# Patient Record
Sex: FEMALE | Race: WHITE | NOT HISPANIC OR LATINO | URBAN - METROPOLITAN AREA
[De-identification: names, ages, dates, MRNs, and addresses within clinical notes are randomized per-mention and may not be internally consistent; named-entity substitution may affect disease eponyms.]

---

## 2017-06-02 ENCOUNTER — EMERGENCY (EMERGENCY)
Facility: HOSPITAL | Age: 47
LOS: 0 days | Discharge: HOME | End: 2017-06-02
Admitting: INTERNAL MEDICINE

## 2017-06-02 PROBLEM — Z00.00 ENCOUNTER FOR PREVENTIVE HEALTH EXAMINATION: Status: ACTIVE | Noted: 2017-06-02

## 2017-06-06 ENCOUNTER — APPOINTMENT (OUTPATIENT)
Dept: VASCULAR SURGERY | Facility: CLINIC | Age: 47
End: 2017-06-06

## 2017-06-06 ENCOUNTER — OUTPATIENT (OUTPATIENT)
Dept: OUTPATIENT SERVICES | Facility: HOSPITAL | Age: 47
LOS: 1 days | Discharge: HOME | End: 2017-06-06

## 2017-06-06 VITALS
BODY MASS INDEX: 36.65 KG/M2 | HEIGHT: 65 IN | DIASTOLIC BLOOD PRESSURE: 78 MMHG | WEIGHT: 220 LBS | SYSTOLIC BLOOD PRESSURE: 122 MMHG

## 2017-06-06 DIAGNOSIS — K22.70 BARRETT'S ESOPHAGUS W/OUT DYSPLASIA: ICD-10-CM

## 2017-06-06 RX ORDER — METAXALONE 800 MG/1
800 TABLET ORAL
Qty: 90 | Refills: 0 | Status: ACTIVE | COMMUNITY
Start: 2017-05-20

## 2017-06-28 DIAGNOSIS — I77.6 ARTERITIS, UNSPECIFIED: ICD-10-CM

## 2017-07-03 ENCOUNTER — OUTPATIENT (OUTPATIENT)
Dept: OUTPATIENT SERVICES | Facility: HOSPITAL | Age: 47
LOS: 1 days | Discharge: HOME | End: 2017-07-03

## 2017-07-03 DIAGNOSIS — Z12.31 ENCOUNTER FOR SCREENING MAMMOGRAM FOR MALIGNANT NEOPLASM OF BREAST: ICD-10-CM

## 2017-07-19 DIAGNOSIS — R20.9 UNSPECIFIED DISTURBANCES OF SKIN SENSATION: ICD-10-CM

## 2017-07-19 DIAGNOSIS — R51 HEADACHE: ICD-10-CM

## 2017-07-19 DIAGNOSIS — M54.2 CERVICALGIA: ICD-10-CM

## 2017-07-19 DIAGNOSIS — Z98.890 OTHER SPECIFIED POSTPROCEDURAL STATES: ICD-10-CM

## 2017-07-19 DIAGNOSIS — R42 DIZZINESS AND GIDDINESS: ICD-10-CM

## 2017-09-01 ENCOUNTER — TRANSCRIPTION ENCOUNTER (OUTPATIENT)
Age: 47
End: 2017-09-01

## 2017-09-21 ENCOUNTER — OUTPATIENT (OUTPATIENT)
Dept: OUTPATIENT SERVICES | Facility: HOSPITAL | Age: 47
LOS: 1 days | Discharge: HOME | End: 2017-09-21

## 2017-09-21 DIAGNOSIS — Z12.31 ENCOUNTER FOR SCREENING MAMMOGRAM FOR MALIGNANT NEOPLASM OF BREAST: ICD-10-CM

## 2017-09-25 DIAGNOSIS — N95.9 UNSPECIFIED MENOPAUSAL AND PERIMENOPAUSAL DISORDER: ICD-10-CM

## 2017-09-25 DIAGNOSIS — E55.9 VITAMIN D DEFICIENCY, UNSPECIFIED: ICD-10-CM

## 2017-09-25 DIAGNOSIS — Z13.820 ENCOUNTER FOR SCREENING FOR OSTEOPOROSIS: ICD-10-CM

## 2017-09-25 DIAGNOSIS — Z79.52 LONG TERM (CURRENT) USE OF SYSTEMIC STEROIDS: ICD-10-CM

## 2017-12-18 ENCOUNTER — APPOINTMENT (OUTPATIENT)
Dept: OTOLARYNGOLOGY | Facility: CLINIC | Age: 47
End: 2017-12-18
Payer: COMMERCIAL

## 2017-12-18 VITALS — HEIGHT: 65 IN | BODY MASS INDEX: 39.99 KG/M2 | WEIGHT: 240 LBS

## 2017-12-18 VITALS — HEIGHT: 65 IN | WEIGHT: 225 LBS | BODY MASS INDEX: 37.49 KG/M2

## 2017-12-18 PROCEDURE — 99204 OFFICE O/P NEW MOD 45 MIN: CPT | Mod: 25

## 2017-12-18 PROCEDURE — 31575 DIAGNOSTIC LARYNGOSCOPY: CPT

## 2018-02-18 ENCOUNTER — OUTPATIENT (OUTPATIENT)
Dept: OUTPATIENT SERVICES | Facility: HOSPITAL | Age: 48
LOS: 1 days | Discharge: HOME | End: 2018-02-18

## 2018-02-20 DIAGNOSIS — G47.33 OBSTRUCTIVE SLEEP APNEA (ADULT) (PEDIATRIC): ICD-10-CM

## 2018-03-23 ENCOUNTER — APPOINTMENT (OUTPATIENT)
Dept: OTOLARYNGOLOGY | Facility: CLINIC | Age: 48
End: 2018-03-23
Payer: COMMERCIAL

## 2018-03-23 VITALS
BODY MASS INDEX: 39.99 KG/M2 | SYSTOLIC BLOOD PRESSURE: 132 MMHG | DIASTOLIC BLOOD PRESSURE: 89 MMHG | HEIGHT: 65 IN | WEIGHT: 240 LBS

## 2018-03-23 DIAGNOSIS — H61.20 IMPACTED CERUMEN, UNSPECIFIED EAR: ICD-10-CM

## 2018-03-23 DIAGNOSIS — R09.81 NASAL CONGESTION: ICD-10-CM

## 2018-03-23 DIAGNOSIS — R06.83 SNORING: ICD-10-CM

## 2018-03-23 PROCEDURE — 69210 REMOVE IMPACTED EAR WAX UNI: CPT

## 2018-03-23 PROCEDURE — 99214 OFFICE O/P EST MOD 30 MIN: CPT | Mod: 25

## 2018-06-02 ENCOUNTER — OUTPATIENT (OUTPATIENT)
Dept: OUTPATIENT SERVICES | Facility: HOSPITAL | Age: 48
LOS: 1 days | Discharge: HOME | End: 2018-06-02

## 2018-06-04 DIAGNOSIS — G47.33 OBSTRUCTIVE SLEEP APNEA (ADULT) (PEDIATRIC): ICD-10-CM

## 2018-07-06 ENCOUNTER — OUTPATIENT (OUTPATIENT)
Dept: OUTPATIENT SERVICES | Facility: HOSPITAL | Age: 48
LOS: 1 days | Discharge: HOME | End: 2018-07-06

## 2018-07-06 DIAGNOSIS — Z12.31 ENCOUNTER FOR SCREENING MAMMOGRAM FOR MALIGNANT NEOPLASM OF BREAST: ICD-10-CM

## 2019-08-08 ENCOUNTER — OUTPATIENT (OUTPATIENT)
Dept: OUTPATIENT SERVICES | Facility: HOSPITAL | Age: 49
LOS: 1 days | Discharge: HOME | End: 2019-08-08
Payer: COMMERCIAL

## 2019-08-08 DIAGNOSIS — Z12.31 ENCOUNTER FOR SCREENING MAMMOGRAM FOR MALIGNANT NEOPLASM OF BREAST: ICD-10-CM

## 2019-08-08 PROCEDURE — 77067 SCR MAMMO BI INCL CAD: CPT | Mod: 26

## 2019-08-08 PROCEDURE — 77063 BREAST TOMOSYNTHESIS BI: CPT | Mod: 26

## 2020-08-12 ENCOUNTER — OUTPATIENT (OUTPATIENT)
Dept: OUTPATIENT SERVICES | Facility: HOSPITAL | Age: 50
LOS: 1 days | Discharge: HOME | End: 2020-08-12
Payer: COMMERCIAL

## 2020-08-12 DIAGNOSIS — Z12.31 ENCOUNTER FOR SCREENING MAMMOGRAM FOR MALIGNANT NEOPLASM OF BREAST: ICD-10-CM

## 2020-08-12 PROCEDURE — 77067 SCR MAMMO BI INCL CAD: CPT | Mod: 26

## 2020-08-12 PROCEDURE — 77063 BREAST TOMOSYNTHESIS BI: CPT | Mod: 26

## 2021-02-16 ENCOUNTER — NON-APPOINTMENT (OUTPATIENT)
Age: 51
End: 2021-02-16

## 2021-08-17 ENCOUNTER — OUTPATIENT (OUTPATIENT)
Dept: OUTPATIENT SERVICES | Facility: HOSPITAL | Age: 51
LOS: 1 days | Discharge: HOME | End: 2021-08-17

## 2021-08-17 ENCOUNTER — APPOINTMENT (OUTPATIENT)
Dept: OBGYN | Facility: CLINIC | Age: 51
End: 2021-08-17
Payer: COMMERCIAL

## 2021-08-17 VITALS — SYSTOLIC BLOOD PRESSURE: 122 MMHG | BODY MASS INDEX: 42.6 KG/M2 | WEIGHT: 256 LBS | DIASTOLIC BLOOD PRESSURE: 80 MMHG

## 2021-08-17 DIAGNOSIS — Z86.79 PERSONAL HISTORY OF OTHER DISEASES OF THE CIRCULATORY SYSTEM: ICD-10-CM

## 2021-08-17 DIAGNOSIS — M06.9 RHEUMATOID ARTHRITIS, UNSPECIFIED: ICD-10-CM

## 2021-08-17 DIAGNOSIS — Z80.3 FAMILY HISTORY OF MALIGNANT NEOPLASM OF BREAST: ICD-10-CM

## 2021-08-17 DIAGNOSIS — Z01.419 ENCOUNTER FOR GYNECOLOGICAL EXAMINATION (GENERAL) (ROUTINE) W/OUT ABNORMAL FINDINGS: ICD-10-CM

## 2021-08-17 PROCEDURE — 99386 PREV VISIT NEW AGE 40-64: CPT

## 2021-08-17 RX ORDER — CALCIUM CARBONATE/VITAMIN D3 600 MG-10
600-400 TABLET ORAL
Qty: 60 | Refills: 6 | Status: ACTIVE | COMMUNITY
Start: 2021-08-17 | End: 1900-01-01

## 2021-08-18 ENCOUNTER — APPOINTMENT (OUTPATIENT)
Dept: CARDIOLOGY | Facility: CLINIC | Age: 51
End: 2021-08-18
Payer: COMMERCIAL

## 2021-08-18 ENCOUNTER — RESULT CHARGE (OUTPATIENT)
Age: 51
End: 2021-08-18

## 2021-08-18 VITALS
DIASTOLIC BLOOD PRESSURE: 80 MMHG | WEIGHT: 254 LBS | SYSTOLIC BLOOD PRESSURE: 122 MMHG | BODY MASS INDEX: 42.32 KG/M2 | HEIGHT: 65 IN | TEMPERATURE: 98.5 F

## 2021-08-18 DIAGNOSIS — R06.02 SHORTNESS OF BREATH: ICD-10-CM

## 2021-08-18 DIAGNOSIS — Z78.9 OTHER SPECIFIED HEALTH STATUS: ICD-10-CM

## 2021-08-18 DIAGNOSIS — Z87.891 PERSONAL HISTORY OF NICOTINE DEPENDENCE: ICD-10-CM

## 2021-08-18 DIAGNOSIS — E66.9 OBESITY, UNSPECIFIED: ICD-10-CM

## 2021-08-18 DIAGNOSIS — E78.00 PURE HYPERCHOLESTEROLEMIA, UNSPECIFIED: ICD-10-CM

## 2021-08-18 PROCEDURE — 93000 ELECTROCARDIOGRAM COMPLETE: CPT

## 2021-08-18 PROCEDURE — 99204 OFFICE O/P NEW MOD 45 MIN: CPT

## 2021-08-18 RX ORDER — ACYCLOVIR 50 MG/G
5 OINTMENT TOPICAL
Qty: 15 | Refills: 0 | Status: DISCONTINUED | COMMUNITY
Start: 2017-05-20 | End: 2021-08-18

## 2021-08-18 RX ORDER — FLUTICASONE PROPIONATE 50 UG/1
50 SPRAY, METERED NASAL
Qty: 1 | Refills: 2 | Status: DISCONTINUED | COMMUNITY
Start: 2017-12-18 | End: 2021-08-18

## 2021-08-18 RX ORDER — BUTALBITAL, ACETAMINOPHEN AND CAFFEINE 325; 50; 40 MG/1; MG/1; MG/1
50-325-40 TABLET ORAL
Qty: 100 | Refills: 0 | Status: DISCONTINUED | COMMUNITY
Start: 2017-05-20 | End: 2021-08-18

## 2021-08-18 RX ORDER — NEOMYCIN SULFATE, POLYMYXIN B SULFATE, HYDROCORTISONE 3.5; 10000; 1 MG/ML; [USP'U]/ML; MG/ML
1 SOLUTION/ DROPS AURICULAR (OTIC)
Qty: 10 | Refills: 0 | Status: DISCONTINUED | COMMUNITY
Start: 2016-12-13 | End: 2021-08-18

## 2021-08-18 RX ORDER — SUCRALFATE 1 G/10ML
1 SUSPENSION ORAL
Qty: 600 | Refills: 0 | Status: ACTIVE | COMMUNITY
Start: 2021-05-10

## 2021-08-18 RX ORDER — BETAMETHASONE DIPROPIONATE 0.5 MG/G
0.05 CREAM, AUGMENTED TOPICAL
Qty: 50 | Refills: 0 | Status: DISCONTINUED | COMMUNITY
Start: 2016-06-30 | End: 2021-08-18

## 2021-08-18 RX ORDER — MELOXICAM 15 MG/1
15 TABLET ORAL
Qty: 30 | Refills: 0 | Status: ACTIVE | COMMUNITY
Start: 2021-05-08

## 2021-08-18 RX ORDER — PANTOPRAZOLE 40 MG/1
40 TABLET, DELAYED RELEASE ORAL DAILY
Refills: 0 | Status: ACTIVE | COMMUNITY

## 2021-08-18 RX ORDER — NORGESTREL AND ETHINYL ESTRADIOL 0.3-0.03MG
0.3-3 KIT ORAL
Qty: 28 | Refills: 0 | Status: DISCONTINUED | COMMUNITY
Start: 2016-11-30 | End: 2021-08-18

## 2021-08-18 RX ORDER — CIPROFLOXACIN AND DEXAMETHASONE 3; 1 MG/ML; MG/ML
0.3-0.1 SUSPENSION/ DROPS AURICULAR (OTIC)
Qty: 7 | Refills: 0 | Status: DISCONTINUED | COMMUNITY
Start: 2017-05-20 | End: 2021-08-18

## 2021-08-18 RX ORDER — FAMOTIDINE 40 MG/1
40 TABLET, FILM COATED ORAL
Refills: 0 | Status: DISCONTINUED | COMMUNITY
End: 2021-08-18

## 2021-08-18 RX ORDER — PANTOPRAZOLE 40 MG/1
40 TABLET, DELAYED RELEASE ORAL
Qty: 90 | Refills: 0 | Status: DISCONTINUED | COMMUNITY
Start: 2017-02-20 | End: 2021-08-18

## 2021-08-18 RX ORDER — BUTALBITAL, ACETAMINOPHEN, AND CAFFEINE 50; 300; 40 MG/1; MG/1; MG/1
50-300-40 CAPSULE ORAL
Refills: 0 | Status: DISCONTINUED | COMMUNITY
End: 2021-08-18

## 2021-08-18 RX ORDER — LINACLOTIDE 145 UG/1
145 CAPSULE, GELATIN COATED ORAL
Qty: 30 | Refills: 0 | Status: ACTIVE | COMMUNITY
Start: 2021-03-26

## 2021-08-18 RX ORDER — METHOTREXATE 2.5 MG/1
2.5 TABLET ORAL
Qty: 24 | Refills: 0 | Status: ACTIVE | COMMUNITY
Start: 2021-07-06

## 2021-08-18 RX ORDER — NALTREXONE HYDROCHLORIDE AND BUPROPION HYDROCHLORIDE 8; 90 MG/1; MG/1
8-90 TABLET, EXTENDED RELEASE ORAL
Qty: 120 | Refills: 0 | Status: DISCONTINUED | COMMUNITY
Start: 2017-04-26 | End: 2021-08-18

## 2021-08-18 RX ORDER — AMOXICILLIN AND CLAVULANATE POTASSIUM 875; 125 MG/1; MG/1
875-125 TABLET, COATED ORAL
Qty: 20 | Refills: 0 | Status: DISCONTINUED | COMMUNITY
Start: 2016-12-13 | End: 2021-08-18

## 2021-08-18 NOTE — REVIEW OF SYSTEMS
[Fever] : no fever [Headache] : no headache [Chills] : no chills [Feeling Fatigued] : feeling fatigued [Lower Ext Edema] : no extremity edema [Leg Claudication] : no intermittent leg claudication [Orthopnea] : no orthopnea [Syncope] : no syncope [Rash] : no rash [Anxiety] : no anxiety [Negative] : Neurological

## 2021-08-18 NOTE — ASSESSMENT
[FreeTextEntry1] : 51-yo female with obesity, MYNOR.\par Severe hyperlipidemia.\par HOOKS.\par \par Plan:\par Continue current diet and weight loss, add daily walking, regular exercise.\par Patient is trying to arrange CPAP machine replacement so she can start using it again.\par Fasting blood work ordered.\par Exercise stress echocardiogram.\par \par Sung Damon MD\par

## 2021-08-24 ENCOUNTER — RESULT REVIEW (OUTPATIENT)
Age: 51
End: 2021-08-24

## 2021-08-24 ENCOUNTER — OUTPATIENT (OUTPATIENT)
Dept: OUTPATIENT SERVICES | Facility: HOSPITAL | Age: 51
LOS: 1 days | Discharge: HOME | End: 2021-08-24
Payer: COMMERCIAL

## 2021-08-24 DIAGNOSIS — Z12.31 ENCOUNTER FOR SCREENING MAMMOGRAM FOR MALIGNANT NEOPLASM OF BREAST: ICD-10-CM

## 2021-08-24 PROCEDURE — 77067 SCR MAMMO BI INCL CAD: CPT | Mod: 26

## 2021-08-24 PROCEDURE — 77063 BREAST TOMOSYNTHESIS BI: CPT | Mod: 26

## 2021-08-27 LAB — HPV HIGH+LOW RISK DNA PNL CVX: NOT DETECTED

## 2021-08-30 ENCOUNTER — APPOINTMENT (OUTPATIENT)
Dept: CARDIOLOGY | Facility: CLINIC | Age: 51
End: 2021-08-30

## 2021-09-03 LAB — CYTOLOGY CVX/VAG DOC THIN PREP: NORMAL

## 2022-04-11 ENCOUNTER — EMERGENCY (EMERGENCY)
Facility: HOSPITAL | Age: 52
LOS: 0 days | Discharge: HOME | End: 2022-04-11
Attending: EMERGENCY MEDICINE | Admitting: EMERGENCY MEDICINE
Payer: COMMERCIAL

## 2022-04-11 VITALS
HEART RATE: 65 BPM | TEMPERATURE: 97 F | HEIGHT: 66 IN | WEIGHT: 255.96 LBS | RESPIRATION RATE: 18 BRPM | SYSTOLIC BLOOD PRESSURE: 130 MMHG | OXYGEN SATURATION: 99 % | DIASTOLIC BLOOD PRESSURE: 60 MMHG

## 2022-04-11 DIAGNOSIS — M54.50 LOW BACK PAIN, UNSPECIFIED: ICD-10-CM

## 2022-04-11 DIAGNOSIS — M06.9 RHEUMATOID ARTHRITIS, UNSPECIFIED: ICD-10-CM

## 2022-04-11 DIAGNOSIS — E78.5 HYPERLIPIDEMIA, UNSPECIFIED: ICD-10-CM

## 2022-04-11 PROCEDURE — 99284 EMERGENCY DEPT VISIT MOD MDM: CPT

## 2022-04-11 RX ORDER — TIZANIDINE 4 MG/1
2 TABLET ORAL
Qty: 56 | Refills: 0
Start: 2022-04-11 | End: 2022-04-17

## 2022-04-11 RX ORDER — KETOROLAC TROMETHAMINE 30 MG/ML
30 SYRINGE (ML) INJECTION ONCE
Refills: 0 | Status: DISCONTINUED | OUTPATIENT
Start: 2022-04-11 | End: 2022-04-11

## 2022-04-11 RX ORDER — METHOCARBAMOL 500 MG/1
1000 TABLET, FILM COATED ORAL ONCE
Refills: 0 | Status: COMPLETED | OUTPATIENT
Start: 2022-04-11 | End: 2022-04-11

## 2022-04-11 RX ADMIN — METHOCARBAMOL 1000 MILLIGRAM(S): 500 TABLET, FILM COATED ORAL at 07:42

## 2022-04-11 RX ADMIN — Medication 30 MILLIGRAM(S): at 07:43

## 2022-04-11 NOTE — ED PROVIDER NOTE - CLINICAL SUMMARY MEDICAL DECISION MAKING FREE TEXT BOX
51-year-old female past medical history as documented with complaints of 2 days of left lower back pain.  Neurologic exam normal.  Patient ambulating well.  Patient improved in the ED with medication.  Patient discharged to home with instructions to take NSAIDs and prescribed tizanidine for symptoms.  Patient given rehab clinic follow-up and strict return instructions.

## 2022-04-11 NOTE — ED PROVIDER NOTE - PHYSICAL EXAMINATION
GENERAL: Well-nourished, Well-developed. NAD.  HEAD: No visible or palpable bumps or hematomas. No ecchymosis behind ears B/L.  Eyes: PERRLA, EOMI. No asymmetry. No nystagmus. No conjunctival injection. Non-icteric sclera.  ENMT: MMM.   Neck: Supple. FROM  CVS: RRR. Normal S1,S2. No murmurs appreciated on auscultation   RESP: No use of accessory muscles. Chest rise symmetrical with good expansion. Lungs clear to auscultation B/L. No wheezing, rales, or rhonchi auscultated.  GI: Normal auscultation of bowel sounds in all 4 quadrants. Soft, Nontender, Nondistended. No guarding or rebound tenderness. No CVAT B/L.  MSK: Extremities w/o deformity or ttp. No visible signs of trauma such as ecchymosis, erythema, or swelling. FROM of upper and lower extremities B/L. No midline spinal TTP. FROM of back with flexion and extension. (+)L straight leg raise. (+)L Lumbar paraspinal muscle ttp  Skin: Warm, Dry. No rashes or lesions. Good cap refill < 2 sec B/L.  EXT: Radial and pedal pulses present B/L. No calf tenderness or swelling B/L. No palpable cords. No pedal edema B/L.  Neuro: AA&O x 3. Sensation grossly intact. Strength 5/5 B/L. Gait within normal limits.

## 2022-04-11 NOTE — ED PROVIDER NOTE - ATTENDING CONTRIBUTION TO CARE
I personally evaluated the patient. I reviewed the Resident’s or Physician Assistant’s note (as assigned above), and agree with the findings and plan except as documented in my no51-year-old female past medical history significant for dyslipidemia, SLE, RA (on methotrexate), complaining of 2 days of left lower back pain.  Back pain is dull and radiates to the left lower extremities to the level of the calf.  Pain worsens with walking and movement and bending.  No recent trauma.  No relief with a heating pad last night and meloxicam this AM.  Normal urination.  No fever, chills.  No bowel or bladder dysfunction or incontinence.  Patient ambulating with antalgic gait.  No IVDA.  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  No history of back pain.  No extremity paresthesias or motor weakness.  Vitals noted.  CONSTITUTIONAL: Well-appearing; well-nourished; in mild apparent distress due to pain.   HEAD: Normocephalic; atraumatic.   EYES: PERRL; EOM intact. Conjunctiva normal B/L.   ENT: Normal pharynx with no tonsillar hypertrophy. MMM.  NECK: Supple; non-tender; no cervical lymphadenopathy.   CHEST: Normal chest excursion with respiration.   CARDIOVASCULAR: Normal S1, S2; no murmurs, rubs, or gallops.   RESPIRATORY: Normal chest excursion with respiration; breath sounds clear and equal bilaterally; no wheezes, rhonchi, or rales.  GI/: Normal bowel sounds; non-distended; non-tender.  BACK: No evidence of trauma or deformity. + tenderness to l lower lumbar paraspinal area. No CVA tenderness. No spine tenderness.   EXT: Normal ROM in all four extremities; non-tender to palpation; distal pulses are normal. No leg edema B/L.   SKIN: Normal for age and race; warm; dry; good turgor.  NEURO: A & O x 4; CN 2-12 intact. Grossly unremarkable. + Antalgic gait. I personally evaluated the patient. I reviewed the Resident’s or Physician Assistant’s note (as assigned above), and agree with the findings and plan except as documented in my note  51-year-old female past medical history significant for dyslipidemia, SLE, RA (on methotrexate), complaining of 2 days of left lower back pain.  Back pain is dull and radiates to the left lower extremities to the level of the calf.  Pain worsens with walking and movement and bending.  No recent trauma.  No relief with a heating pad last night and meloxicam this AM.  Normal urination.  No fever, chills.  No bowel or bladder dysfunction or incontinence.  Patient ambulating with antalgic gait.  No IVDA.  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  No history of back pain.  No extremity paresthesias or motor weakness.  Vitals noted.  CONSTITUTIONAL: Well-appearing; well-nourished; in mild apparent distress due to pain.   HEAD: Normocephalic; atraumatic.   EYES: PERRL; EOM intact. Conjunctiva normal B/L.   ENT: Normal pharynx with no tonsillar hypertrophy. MMM.  NECK: Supple; non-tender; no cervical lymphadenopathy.   CHEST: Normal chest excursion with respiration.   CARDIOVASCULAR: Normal S1, S2; no murmurs, rubs, or gallops.   RESPIRATORY: Normal chest excursion with respiration; breath sounds clear and equal bilaterally; no wheezes, rhonchi, or rales.  GI/: Normal bowel sounds; non-distended; non-tender.  BACK: No evidence of trauma or deformity. + tenderness to l lower lumbar paraspinal area. No CVA tenderness. No spine tenderness.   EXT: Normal ROM in all four extremities; non-tender to palpation; distal pulses are normal. No leg edema B/L.   SKIN: Normal for age and race; warm; dry; good turgor.  NEURO: A & O x 4; CN 2-12 intact. Grossly unremarkable. + Antalgic gait.

## 2022-04-11 NOTE — ED PROVIDER NOTE - OBJECTIVE STATEMENT
52 yo F pmhx TAWANNA, ALLY presenting to the ED for evaluation of L lower back pain radiating to LLE since last night 52 yo F pmhx TAWANNA, ALLY presenting to the ED for evaluation of atraumatic L lower back pain radiating to LLE since last night. Pt reports pain is constant, achy, and shooting down LLE. Pt applied heating pad last night and took 1 meloxicam this morning with minimal relief. Pain is worse with movement and ambulation. Denies any trauma or inciting event. Denies fever, chills, numbness/tingling, weakness, incontinence, saddle anesthesia, dysuria, hematuria.

## 2022-04-11 NOTE — ED PROVIDER NOTE - NS ED ROS FT
Constitutional: No fever, chills.  Eyes:  No visual changes  ENMT:  No neck pain  Cardiac:  No chest pain  Respiratory:  No cough, SOB  GI:  No nausea, vomiting, diarrhea, abdominal pain.  :  No dysuria, hematuria  MS:  (+)back pain.  Neuro:  No numbness/tingling or weakness  Skin:  No skin rash  Endocrine: No history of thyroid disease or diabetes.  Except as documented in the HPI,  all other systems are negative.

## 2022-04-11 NOTE — ED PROVIDER NOTE - NSFOLLOWUPCLINICS_GEN_ALL_ED_FT
[facility] ([facility])  Rehabilitation  [facility] [address], [address] [place] [address]  Phone: [phone]  Fax:   Follow Up Time: 1-3 Days

## 2022-04-11 NOTE — ED PROVIDER NOTE - NSFOLLOWUPINSTRUCTIONS_ED_ALL_ED_FT
Back Pain    WHAT YOU NEED TO KNOW:    Back pain is common. It can be caused by many conditions, such as arthritis or the breakdown of spinal discs. Your risk for back pain is increased by injuries, lack of activity, or repeated bending and twisting. You may feel sore or stiff on one or both sides of your back. The pain may spread to your buttocks or thighs.    DISCHARGE INSTRUCTIONS:    Return to the emergency department if:     You have pain, numbness, or weakness in one or both legs.      Your pain becomes so severe that you cannot walk.      You cannot control your urine or bowel movements.      You have severe back pain with chest pain.      You have severe back pain, nausea, and vomiting.      You have severe back pain that spreads to your side or genital area.    Contact your healthcare provider if:     You have back pain that does not get better with rest and pain medicine.      You have a fever.      You have pain that worsens when you are on your back or when you rest.      You have pain that worsens when you cough or sneeze.      You lose weight without trying.      You have questions or concerns about your condition or care.    Medicines:     NSAIDs help decrease swelling and pain. This medicine is available with or without a doctor's order. NSAIDs can cause stomach bleeding or kidney problems in certain people. If you take blood thinner medicine, always ask your healthcare provider if NSAIDs are safe for you. Always read the medicine label and follow directions.      Acetaminophen decreases pain and fever. It is available without a doctor's order. Ask how much to take and how often to take it. Follow directions. Read the labels of all other medicines you are using to see if they also contain acetaminophen, or ask your doctor or pharmacist. Acetaminophen can cause liver damage if not taken correctly. Do not use more than 4 grams (4,000 milligrams) total of acetaminophen in one day.       Muscle relaxers help decrease muscle spasms and back pain.      Prescription pain medicine may be given. Ask your healthcare provider how to take this medicine safely. Some prescription pain medicines contain acetaminophen. Do not take other medicines that contain acetaminophen without talking to your healthcare provider. Too much acetaminophen may cause liver damage. Prescription pain medicine may cause constipation. Ask your healthcare provider how to prevent or treat constipation.       Take your medicine as directed. Contact your healthcare provider if you think your medicine is not helping or if you have side effects. Tell him or her if you are allergic to any medicine. Keep a list of the medicines, vitamins, and herbs you take. Include the amounts, and when and why you take them. Bring the list or the pill bottles to follow-up visits. Carry your medicine list with you in case of an emergency.    How to manage your back pain:     Apply ice on your back for 15 to 20 minutes every hour or as directed. Use an ice pack, or put crushed ice in a plastic bag. Cover it with a towel before you apply it to your skin. Ice helps prevent tissue damage and decreases pain.      Apply heat on your back for 20 to 30 minutes every 2 hours for as many days as directed. Heat helps decrease pain and muscle spasms.      Stay active as much as you can without causing more pain. Bed rest could make your back pain worse. Avoid heavy lifting until your pain is gone.      Go to physical therapy as directed. A physical therapist can teach you exercises to help improve movement and strength, and to decrease pain.    Follow up with your healthcare provider in 2 weeks, or as directed: Write down your questions so you remember to ask them during your visits.       © Copyright [employer] 2019 All illustrations and images included in CareNotes are the copyrighted property of [employer] or [employer].

## 2022-04-11 NOTE — ED PROVIDER NOTE - NS ED ATTENDING STATEMENT MOD
This was a shared visit with the ROMARIO. I reviewed and verified the documentation and independently performed the documented:

## 2022-04-11 NOTE — ED PROVIDER NOTE - NSFOLLOWUPCLINICSTOKEN_GEN_ALL_ED_FT
IF YOU HAVE ANY QUESTIONS REGARDING YOUR HOME MEDICATIONS PLEASE CONTACT YOUR PRIMARY CARE PHYSICIAN   [id number]:1-3 Days|| ||00\01||False;

## 2022-07-26 ENCOUNTER — APPOINTMENT (OUTPATIENT)
Dept: CARDIOLOGY | Facility: CLINIC | Age: 52
End: 2022-07-26

## 2022-08-23 ENCOUNTER — APPOINTMENT (OUTPATIENT)
Dept: OBGYN | Facility: CLINIC | Age: 52
End: 2022-08-23

## 2022-08-25 ENCOUNTER — OUTPATIENT (OUTPATIENT)
Dept: OUTPATIENT SERVICES | Facility: HOSPITAL | Age: 52
LOS: 1 days | Discharge: HOME | End: 2022-08-25

## 2022-08-25 DIAGNOSIS — Z12.31 ENCOUNTER FOR SCREENING MAMMOGRAM FOR MALIGNANT NEOPLASM OF BREAST: ICD-10-CM

## 2022-08-25 PROCEDURE — 77063 BREAST TOMOSYNTHESIS BI: CPT | Mod: 26

## 2022-08-25 PROCEDURE — 77067 SCR MAMMO BI INCL CAD: CPT | Mod: 26

## 2023-08-28 ENCOUNTER — OUTPATIENT (OUTPATIENT)
Dept: OUTPATIENT SERVICES | Facility: HOSPITAL | Age: 53
LOS: 1 days | End: 2023-08-28
Payer: COMMERCIAL

## 2023-08-28 DIAGNOSIS — Z12.31 ENCOUNTER FOR SCREENING MAMMOGRAM FOR MALIGNANT NEOPLASM OF BREAST: ICD-10-CM

## 2023-08-28 PROCEDURE — 77063 BREAST TOMOSYNTHESIS BI: CPT | Mod: 26

## 2023-08-28 PROCEDURE — 77063 BREAST TOMOSYNTHESIS BI: CPT

## 2023-08-28 PROCEDURE — 77067 SCR MAMMO BI INCL CAD: CPT

## 2023-08-28 PROCEDURE — 77067 SCR MAMMO BI INCL CAD: CPT | Mod: 26

## 2023-08-29 DIAGNOSIS — Z12.31 ENCOUNTER FOR SCREENING MAMMOGRAM FOR MALIGNANT NEOPLASM OF BREAST: ICD-10-CM

## 2023-10-19 NOTE — HISTORY OF PRESENT ILLNESS
Patient: Helen Donato Date: 10/19/2023   : 1967 Attending: Danial Mcguire MD   56 year old female Room: [facility]     Chief Complaint: Shortness of breath and fatigue     Preoperative   Surgical Procedure: AVR, TVR     Subjective: Denies SOB and CP     Vital 24 Hour Range Last Value   Temperature Temp  Min: 98.3 °F (36.8 °C)  Max: 98.5 °F (36.9 °C) 98.5 °F (36.9 °C) (10/19/23 1527)   Pulse Pulse  Min: 73  Max: 80 80 (10/19/23 09)   Respiratory Resp  Min: 18  Max: 18 18 (10/19/23 1405)   Non-Invasive  Blood Pressure BP  Min: 126/65  Max: 130/64 130/64 (10/19/23 0927)   Pulse Oximetry SpO2  Min: 95 %  Max: 96 % 96 % (10/19/23 0927)     Oxygen: RA    Weight over the past 48 Hours:  Patient Vitals for the past 48 hrs:   Weight   10/17/23 1628 56 kg (123 lb 7.3 oz)   10/18/23 0500 55.2 kg (121 lb 9.6 oz)   10/19/23 0600 54.8 kg (120 lb 13 oz)      Admit Weight:   Weight: 56 kg (123 lb 7.3 oz) (10/17/23 1628)  BMI (body mass index):   BMI (Calculated): 19.34 (10/17/23 162)    Intake/Output:    Last Stool Occurrence:  (LBM 10/17 per pt) (10/18/23 0300)    Rhythm: Sinus rhythm    Laboratory Results:    Recent Labs   Lab 10/19/23  0346 10/18/23  034   SODIUM 141 141   POTASSIUM 4.4 4.5   CHLORIDE 110 111*   CO2 27 26   BUN 23* 25*   CREATININE 0.94 1.00*   GLUCOSE 97 87   MG 2.0 2.0   WBC 8.8 7.6   HGB 9.1* 8.6*   HCT 29.7* 27.7*    354       Recent Labs   Lab 10/19/23  034   AST 22   GPT 23   ALKPT 77   BILIRUBIN 0.3       Hemoglobin A1C (%)   Date Value   2021 5.7 (H)       Carotid US:  No results found for this or any previous visit.       Medications/Infusions:  Scheduled:   Current Facility-Administered Medications   Medication Dose Route Frequency Provider Last Rate Last Admin   • sodium chloride 0.9 % injection 2 mL  2 mL Intracatheter 2 times per day Danial Mcguire MD   2 mL at 10/19/23 0931   • heparin (porcine) injection 5,000 Units  5,000 Units Subcutaneous 3 times per day Danial Mcguire,  MD       • docusate sodium-sennosides (SENOKOT S) 50-8.6 MG 2 tablet  2 tablet Oral Nightly Danial Mcguire MD       • lactobacillus acidophilus (BACID) tablet 1 tablet  1 tablet Oral Daily Danial Mcguire MD   1 tablet at 10/19/23 0930   • pantoprazole (PROTONIX) EC tablet 40 mg  40 mg Oral Nightly Danial Mcguire MD   40 mg at 10/18/23 2145   • diclofenac (VOLTAREN) 1 % gel 2 g  2 g Topical 4x Daily Danial Mcguire MD       • escitalopram (LEXAPRO) tablet 20 mg  20 mg Oral Daily Danial Mcguire MD   20 mg at 10/19/23 0930   • ferrous sulfate (65 mg Fe per 325 mg) tablet 325 mg  325 mg Oral Daily with breakfast Danial Mcguire MD   325 mg at 10/19/23 0930   • folic acid (FOLATE) tablet 1 mg  1 mg Oral Daily Danial Mcguire MD   1 mg at 10/19/23 0930   • gabapentin (NEURONTIN) capsule 600 mg  600 mg Oral 3 times per day Danial Mcguire MD   600 mg at 10/19/23 1405   • QUEtiapine (SEROquel) tablet 75 mg  75 mg Oral Nightly Danial Mcguire MD   75 mg at 10/18/23 2145   • thiamine (VITAMIN B1) tablet 100 mg  100 mg Oral Daily Danial Mcguire MD   100 mg at 10/19/23 0930       Continuous Infusions:   Current Facility-Administered Medications   Medication Dose Route Frequency Provider Last Rate Last Admin   • sodium chloride 0.9% infusion   Intravenous Continuous Tika Núñez APNP         STS Risk of Mortality: N/A    Pre-op anemia consult: Yes    Fraility Index  6 - moderately frail    New York Heart Association Classification  Class II: Comfortable at rest, symptomatic with ordinary physical activity    CHADSVASC Stroke Risk Points     Patient's CHADSVASC Score Total = 1    Patient's CHADSVASC Score Summary    Element Patient Score   Congestive Heart Failure 0   High blood pressure 0   Age 0    Diabetes 0   Stroke/TIA/Clot 0   Vascular disease 0   Sex 1     Assessment/Plan:  Endocarditis with septic emboli, AV and TV vegetations and AI:    -Plan for AVR/TVR with Dr. Bailey on 10/23   -Pre-op orders initiated   -Consult Neurology for  [FreeTextEntry1] : 51-yo female with h/o RA, giant cell arteritis (on high-dose steroids for 2 years with weight gain of 100 pounds). Patient has been on keto diet in the last few months. She has lost 45 pounds but her cholesterol is very high now. Patient c/o HOOKS  (she is out of breath after 1 flight of stairs). She denies CP or palpitations.\par \par She also has a h/o MYNOR, her CPAP has just been recalled. She c/o loud snoring and daytime fatigue. surgical clearance   -Will need full edentualation with Dr. De prior to surgery    -ID following   Hx IV drug use:   -Consult pain management and psychiatric medicine     Discussed with or notes reviewed:  Patient, RN and MD

## 2024-02-15 ENCOUNTER — INPATIENT (INPATIENT)
Facility: HOSPITAL | Age: 54
LOS: 0 days | Discharge: HOME | DRG: 103 | End: 2024-02-16
Attending: STUDENT IN AN ORGANIZED HEALTH CARE EDUCATION/TRAINING PROGRAM | Admitting: STUDENT IN AN ORGANIZED HEALTH CARE EDUCATION/TRAINING PROGRAM
Payer: COMMERCIAL

## 2024-02-15 VITALS
HEART RATE: 82 BPM | TEMPERATURE: 98 F | DIASTOLIC BLOOD PRESSURE: 84 MMHG | SYSTOLIC BLOOD PRESSURE: 165 MMHG | OXYGEN SATURATION: 97 % | RESPIRATION RATE: 17 BRPM

## 2024-02-15 DIAGNOSIS — R51.9 HEADACHE, UNSPECIFIED: ICD-10-CM

## 2024-02-15 LAB
ALBUMIN SERPL ELPH-MCNC: 4.2 G/DL — SIGNIFICANT CHANGE UP (ref 3.5–5.2)
ALP SERPL-CCNC: 120 U/L — HIGH (ref 30–115)
ALT FLD-CCNC: 14 U/L — SIGNIFICANT CHANGE UP (ref 0–41)
ANION GAP SERPL CALC-SCNC: 7 MMOL/L — SIGNIFICANT CHANGE UP (ref 7–14)
AST SERPL-CCNC: 16 U/L — SIGNIFICANT CHANGE UP (ref 0–41)
BASOPHILS # BLD AUTO: 0.02 K/UL — SIGNIFICANT CHANGE UP (ref 0–0.2)
BASOPHILS NFR BLD AUTO: 0.3 % — SIGNIFICANT CHANGE UP (ref 0–1)
BILIRUB SERPL-MCNC: 0.2 MG/DL — SIGNIFICANT CHANGE UP (ref 0.2–1.2)
BUN SERPL-MCNC: 13 MG/DL — SIGNIFICANT CHANGE UP (ref 10–20)
CALCIUM SERPL-MCNC: 9.3 MG/DL — SIGNIFICANT CHANGE UP (ref 8.4–10.5)
CHLORIDE SERPL-SCNC: 108 MMOL/L — SIGNIFICANT CHANGE UP (ref 98–110)
CO2 SERPL-SCNC: 26 MMOL/L — SIGNIFICANT CHANGE UP (ref 17–32)
CREAT SERPL-MCNC: 0.6 MG/DL — LOW (ref 0.7–1.5)
CRP SERPL-MCNC: 3.5 MG/L — SIGNIFICANT CHANGE UP
EGFR: 107 ML/MIN/1.73M2 — SIGNIFICANT CHANGE UP
EOSINOPHIL # BLD AUTO: 0.03 K/UL — SIGNIFICANT CHANGE UP (ref 0–0.7)
EOSINOPHIL NFR BLD AUTO: 0.4 % — SIGNIFICANT CHANGE UP (ref 0–8)
ERYTHROCYTE [SEDIMENTATION RATE] IN BLOOD: 21 MM/HR — HIGH (ref 0–20)
GLUCOSE SERPL-MCNC: 98 MG/DL — SIGNIFICANT CHANGE UP (ref 70–99)
HCT VFR BLD CALC: 44.3 % — SIGNIFICANT CHANGE UP (ref 37–47)
HGB BLD-MCNC: 14.8 G/DL — SIGNIFICANT CHANGE UP (ref 12–16)
IMM GRANULOCYTES NFR BLD AUTO: 0.3 % — SIGNIFICANT CHANGE UP (ref 0.1–0.3)
LYMPHOCYTES # BLD AUTO: 1.51 K/UL — SIGNIFICANT CHANGE UP (ref 1.2–3.4)
LYMPHOCYTES # BLD AUTO: 21.2 % — SIGNIFICANT CHANGE UP (ref 20.5–51.1)
MCHC RBC-ENTMCNC: 31.9 PG — HIGH (ref 27–31)
MCHC RBC-ENTMCNC: 33.4 G/DL — SIGNIFICANT CHANGE UP (ref 32–37)
MCV RBC AUTO: 95.5 FL — SIGNIFICANT CHANGE UP (ref 81–99)
MONOCYTES # BLD AUTO: 0.7 K/UL — HIGH (ref 0.1–0.6)
MONOCYTES NFR BLD AUTO: 9.8 % — HIGH (ref 1.7–9.3)
NEUTROPHILS # BLD AUTO: 4.85 K/UL — SIGNIFICANT CHANGE UP (ref 1.4–6.5)
NEUTROPHILS NFR BLD AUTO: 68 % — SIGNIFICANT CHANGE UP (ref 42.2–75.2)
NRBC # BLD: 0 /100 WBCS — SIGNIFICANT CHANGE UP (ref 0–0)
PLATELET # BLD AUTO: 198 K/UL — SIGNIFICANT CHANGE UP (ref 130–400)
PMV BLD: 9 FL — SIGNIFICANT CHANGE UP (ref 7.4–10.4)
POTASSIUM SERPL-MCNC: 5.2 MMOL/L — HIGH (ref 3.5–5)
POTASSIUM SERPL-SCNC: 5.2 MMOL/L — HIGH (ref 3.5–5)
PROT SERPL-MCNC: 7.3 G/DL — SIGNIFICANT CHANGE UP (ref 6–8)
RBC # BLD: 4.64 M/UL — SIGNIFICANT CHANGE UP (ref 4.2–5.4)
RBC # FLD: 13 % — SIGNIFICANT CHANGE UP (ref 11.5–14.5)
SODIUM SERPL-SCNC: 141 MMOL/L — SIGNIFICANT CHANGE UP (ref 135–146)
WBC # BLD: 7.13 K/UL — SIGNIFICANT CHANGE UP (ref 4.8–10.8)
WBC # FLD AUTO: 7.13 K/UL — SIGNIFICANT CHANGE UP (ref 4.8–10.8)

## 2024-02-15 PROCEDURE — 93005 ELECTROCARDIOGRAM TRACING: CPT

## 2024-02-15 PROCEDURE — 72040 X-RAY EXAM NECK SPINE 2-3 VW: CPT

## 2024-02-15 PROCEDURE — 70544 MR ANGIOGRAPHY HEAD W/O DYE: CPT

## 2024-02-15 PROCEDURE — 99285 EMERGENCY DEPT VISIT HI MDM: CPT

## 2024-02-15 PROCEDURE — 99222 1ST HOSP IP/OBS MODERATE 55: CPT

## 2024-02-15 PROCEDURE — 70548 MR ANGIOGRAPHY NECK W/DYE: CPT

## 2024-02-15 PROCEDURE — 93010 ELECTROCARDIOGRAM REPORT: CPT

## 2024-02-15 PROCEDURE — 70548 MR ANGIOGRAPHY NECK W/DYE: CPT | Mod: 26

## 2024-02-15 PROCEDURE — 86140 C-REACTIVE PROTEIN: CPT

## 2024-02-15 PROCEDURE — 72040 X-RAY EXAM NECK SPINE 2-3 VW: CPT | Mod: 26

## 2024-02-15 PROCEDURE — 70544 MR ANGIOGRAPHY HEAD W/O DYE: CPT | Mod: 26

## 2024-02-15 PROCEDURE — A9579: CPT

## 2024-02-15 PROCEDURE — 36415 COLL VENOUS BLD VENIPUNCTURE: CPT

## 2024-02-15 PROCEDURE — 70450 CT HEAD/BRAIN W/O DYE: CPT | Mod: 26,MA

## 2024-02-15 RX ORDER — METHOTREXATE 2.5 MG/1
15 TABLET ORAL
Refills: 0 | Status: DISCONTINUED | OUTPATIENT
Start: 2024-02-15 | End: 2024-02-15

## 2024-02-15 RX ORDER — ENOXAPARIN SODIUM 100 MG/ML
40 INJECTION SUBCUTANEOUS EVERY 24 HOURS
Refills: 0 | Status: DISCONTINUED | OUTPATIENT
Start: 2024-02-15 | End: 2024-02-16

## 2024-02-15 RX ORDER — LANSOPRAZOLE 15 MG/1
1 CAPSULE, DELAYED RELEASE ORAL
Refills: 0 | DISCHARGE

## 2024-02-15 RX ORDER — PROCHLORPERAZINE MALEATE 5 MG
10 TABLET ORAL ONCE
Refills: 0 | Status: COMPLETED | OUTPATIENT
Start: 2024-02-15 | End: 2024-02-15

## 2024-02-15 RX ORDER — METHOTREXATE 2.5 MG/1
17.5 TABLET ORAL
Refills: 0 | Status: DISCONTINUED | OUTPATIENT
Start: 2024-02-15 | End: 2024-02-16

## 2024-02-15 RX ORDER — METHOTREXATE 2.5 MG/1
2.5 TABLET ORAL
Refills: 0 | Status: DISCONTINUED | OUTPATIENT
Start: 2024-02-15 | End: 2024-02-15

## 2024-02-15 RX ORDER — IBUPROFEN 200 MG
600 TABLET ORAL ONCE
Refills: 0 | Status: COMPLETED | OUTPATIENT
Start: 2024-02-15 | End: 2024-02-15

## 2024-02-15 RX ORDER — LEUCOVORIN CALCIUM 5 MG
5 TABLET ORAL
Refills: 0 | Status: DISCONTINUED | OUTPATIENT
Start: 2024-02-15 | End: 2024-02-16

## 2024-02-15 RX ORDER — KETOROLAC TROMETHAMINE 30 MG/ML
30 SYRINGE (ML) INJECTION ONCE
Refills: 0 | Status: DISCONTINUED | OUTPATIENT
Start: 2024-02-15 | End: 2024-02-15

## 2024-02-15 RX ORDER — PANTOPRAZOLE SODIUM 20 MG/1
40 TABLET, DELAYED RELEASE ORAL
Refills: 0 | Status: DISCONTINUED | OUTPATIENT
Start: 2024-02-15 | End: 2024-02-16

## 2024-02-15 RX ORDER — SODIUM CHLORIDE 9 MG/ML
1000 INJECTION, SOLUTION INTRAVENOUS ONCE
Refills: 0 | Status: COMPLETED | OUTPATIENT
Start: 2024-02-15 | End: 2024-02-15

## 2024-02-15 RX ORDER — FAMOTIDINE 10 MG/ML
1 INJECTION INTRAVENOUS
Refills: 0 | DISCHARGE

## 2024-02-15 RX ORDER — LEUCOVORIN CALCIUM 5 MG
20 TABLET ORAL
Refills: 0 | Status: DISCONTINUED | OUTPATIENT
Start: 2024-02-15 | End: 2024-02-15

## 2024-02-15 RX ORDER — IBUPROFEN 200 MG
600 TABLET ORAL EVERY 6 HOURS
Refills: 0 | Status: DISCONTINUED | OUTPATIENT
Start: 2024-02-15 | End: 2024-02-16

## 2024-02-15 RX ORDER — IBUPROFEN 200 MG
600 TABLET ORAL EVERY 6 HOURS
Refills: 0 | Status: DISCONTINUED | OUTPATIENT
Start: 2024-02-15 | End: 2024-02-15

## 2024-02-15 RX ORDER — LEUCOVORIN CALCIUM 5 MG
2 TABLET ORAL
Refills: 0 | DISCHARGE

## 2024-02-15 RX ORDER — ACETAMINOPHEN 500 MG
975 TABLET ORAL ONCE
Refills: 0 | Status: COMPLETED | OUTPATIENT
Start: 2024-02-15 | End: 2024-02-15

## 2024-02-15 RX ORDER — FAMOTIDINE 10 MG/ML
40 INJECTION INTRAVENOUS DAILY
Refills: 0 | Status: DISCONTINUED | OUTPATIENT
Start: 2024-02-15 | End: 2024-02-16

## 2024-02-15 RX ADMIN — Medication 600 MILLIGRAM(S): at 14:15

## 2024-02-15 RX ADMIN — Medication 60 MILLIGRAM(S): at 10:09

## 2024-02-15 NOTE — ED PROVIDER NOTE - OBJECTIVE STATEMENT
Patient is a 53-year-old female with past medical history of rheumatoid arthritis, hyperlipidemia, GCA 5 years ago presenting with 9 days of headache.  Patient says headache was gradual in onset, initially worse in the frontal aspect of her scalp, now temporal, left over right.  Denies vision change but says her eyesight "feels slightly off."  Denies fever.  Denies head trauma.  Denies focal numbness or weakness.  States that it feels similar to past GCA occurrence, states that a biopsy was never performed at that time.  Patient otherwise well

## 2024-02-15 NOTE — ED PROVIDER NOTE - ATTENDING CONTRIBUTION TO CARE
I have personally performed a history and physical exam on this patient and personally directed the management of the patient.  Patient is a 53-year-old female with past medical history of rheumatoid arthritis, hyperlipidemia, GCA 5 years ago presenting with 9 days of headache.  Patient says headache was gradual in onset, initially worse in the frontal aspect of her scalp, now temporal, left over right.  Denies vision change but says her eyesight "feels slightly off."  CON: appears stated age, pleasant, no acute distress, HENMT: normocephalic, atraumatic, anicteric, no conjunctival injection, mild tenderness over left temporal area of scalp  CV: regular rhythm, distal pulses intact, RESP: no acute respiratory distress, no stridor, breathing comfortably on RA , GI:  soft, nontender, no rebound, no guarding, SKIN: no wounds MSK: no deformities, NEURO: no gross motor or sensory deficit Psychiatric: appropriate mood, appropriate affect, vision at baseline  will send labs ct head give prednisone and likely admit

## 2024-02-15 NOTE — CONSULT NOTE ADULT - SUBJECTIVE AND OBJECTIVE BOX
Patient is a 53y old  Female who presents with a chief complaint of Headache (15 Feb 2024 15:03)    HPI:  Ms. Dorantes is a 54 yo F w/ a PMH of Rheumatoid Arthritis, GCA (5 years ago), migraines, Bustamante Esophagus, and MYNOR presenting for progressive L sided headache x9 days. She states she was at work 9 days ago when she started to experience a severe headache that waxed and waned throughout the day. She states over the next 9 days she would repeatedly experience a L sided headache around her temple area that worsened w/ exposure to light. She endorses that she had 10/10 pain on arrival now 8/10 at time of examination. She denies fever, chest pain, shortness of breath. She states she experiences some nausea but no vomiting or diarrhea. She denies change in vision but is concerned that presentation is similar to when she had GCA 5 years ago. She is being admitted to medicine for further workup of headache.    Vitals    Labs  WBC: 7.13  K: 5.2  ESR: 21    Imaging  CT Head Non-con: Negative for acute pathology    In the ED  Prednisone 60mg x1  1L LR Bolus  Tylenol 975mg x1  Comapzine 10mg x1   (15 Feb 2024 15:03)    Additional Information:    REVIEW OF SYSTEMS:  ROS negative except as mentioned in HPI    MEDICATIONS  (STANDING):  enoxaparin Injectable 40 milliGRAM(s) SubCutaneous every 24 hours  famotidine    Tablet 40 milliGRAM(s) Oral daily  leucovorin 5 milliGRAM(s) Oral <User Schedule>  methotrexate 15 milliGRAM(s) Oral every week  pantoprazole    Tablet 40 milliGRAM(s) Oral before breakfast    MEDICATIONS  (PRN):  ibuprofen  Tablet. 600 milliGRAM(s) Oral every 6 hours PRN Temp greater or equal to 38C (100.4F), Mild Pain (1 - 3), Moderate Pain (4 - 6)    Allergies    No Known Allergies    Intolerances      PPD:  Vaccines:    PAST MEDICAL & SURGICAL HISTORY:    Growth & Development:    FAMILY HISTORY:  [] Arthritis:  [] Lupus/Collagen Vascular:  [] Psoriasis:  [] Uveitis:  [] Thyroid Disease:  [] Ankylosing Spondylitis:  [] Lyme  [] IBD  [] Acute Rheumatic Fever  [] Diabetes    SOCIAL HISTORY:  School Performance/Attendance:  [] Animal/Insect Exposure:    Vital Signs Last 24 Hrs  T(C): 36.8 (15 Feb 2024 07:35), Max: 36.8 (15 Feb 2024 07:35)  T(F): 98.3 (15 Feb 2024 07:35), Max: 98.3 (15 Feb 2024 07:35)  HR: 82 (15 Feb 2024 07:35) (82 - 82)  BP: 165/84 (15 Feb 2024 07:35) (165/84 - 165/84)  BP(mean): --  RR: 17 (15 Feb 2024 07:35) (17 - 17)  SpO2: 97% (15 Feb 2024 07:35) (97% - 97%)    Parameters below as of 15 Feb 2024 07:35  Patient On (Oxygen Delivery Method): room air      Daily     Daily     PHYSICAL EXAM:    GENERAL: NAD, anxious  HEAD:  Atraumatic, Normocephalic  EYES: EOMI, PERRLA, conjunctiva and sclera clear  ENT: Moist mucous membranes  NECK: Supple, No JVD  CHEST/LUNG: Clear to auscultation bilaterally; No rales, rhonchi, wheezing, or rubs. Unlabored respirations  HEART: Regular rate and rhythm; No murmurs, rubs, or gallops  ABDOMEN: BSx4; Soft, nontender, nondistended  EXTREMITIES:  no LE edema      Lab Results:                        14.8   7.13  )-----------( 198      ( 15 Feb 2024 09:28 )             44.3     02-15    141  |  108  |  13  ----------------------------<  98  5.2<H>   |  26  |  0.6<L>    Ca    9.3      15 Feb 2024 09:28    TPro  7.3  /  Alb  4.2  /  TBili  0.2  /  DBili  x   /  AST  16  /  ALT  14  /  AlkPhos  120<H>  02-15      Color: x / Appearance: x / SG: x / pH: x  Gluc: 98 mg/dL / Ketone: x  / Bili: x / Urobili: x   Blood: x / Protein: x / Nitrite: x   Leuk Esterase: x / RBC: x / WBC x   Sq Epi: x / Non Sq Epi: x / Bacteria: x

## 2024-02-15 NOTE — CONSULT NOTE ADULT - ASSESSMENT
Ms. Dorantes is a 52 yo F w/ a PMH of Rheumatoid Arthritis, GCA (5 years ago), migraines, Bustamante Esophagus, and MYNOR presenting for progressive L sided headache x9 days. rheumatology consulted for concern for GCA    #headache  #H/O GCA 5 years ago  - CT head negative   - ESR 21  - no vision changes or jaw claudication    #RA  - on methotrexate 7 tablets once a week (doesnt remember the dose)  - on leucovorin Ms. Dorantes is a 54 yo F w/ a PMH of Rheumatoid Arthritis, GCA (5 years ago), migraines, Bustamante Esophagus, and MYNOR presenting for progressive L sided headache x9 days. rheumatology consulted for concern for GCA    #headache  #H/O GCA 5 years ago  - CT head negative   - ESR 21, CRP 3.5  - no vision changes or jaw claudication  - low suspicion for GCA.   - if concern for vasculitis, can consider Takayasu given her age - would recommend MRA brain   - can continue with prednisone 1mg/kg       #RA  - on methotrexate 2.5 mg, 7 tablets once a week -> total of 17.5 mg of methotrexate once per week  - on leucovorin 10 mg, 2 tabs, once per week    Discussed with attending

## 2024-02-15 NOTE — H&P ADULT - NSHPPHYSICALEXAM_GEN_ALL_CORE
General: Not in acute distress  Head: NCAT, no swelling or erythema. Tenderness to palpation of b/l temples, worse on L temple. Tenderness on palpation of occipital region of head  Neck: Mild tenderness of lower neck/shoulders  Cardiac: Regular rate and rhythm. No murmurs, gallops, or rubs  Pulmonary: CTAB  Abdominal: Soft, nontender, and nondistended with positive bowel sounds  Extremities: No pitting edema  Neurologic: AOx3, nonfocal General: Not in acute distress  Head: NCAT, no swelling or erythema. Tenderness to palpation of b/l temples, worse on L temple. Tenderness on palpation of occipital region of head  Eyes: PERRL, denies changes in vision, endorses photophobia of L eye  Neck: Mild tenderness of lower neck/shoulders  Cardiac: Regular rate and rhythm. No murmurs, gallops, or rubs  Pulmonary: CTAB  Abdominal: Soft, nontender, and nondistended with positive bowel sounds  Extremities: No pitting edema  Neurologic: AOx3, nonfocal

## 2024-02-15 NOTE — CONSULT NOTE ADULT - ATTENDING COMMENTS
54 y/o woman admitted with headache, rheumatology consulted to evaluate for giant cell arteritis. Pt says that 5 years ago, she developed headache. At that time, she was diagnosed with giant cell arteritis based on an MRI (MRA?)? She was on steroids for 2 years. She has been feeling generally well since that time, with occasional headaches, until 9 days prior to admission, when she developed severe L-sided temporal headache with sensitivity to light, prompting her to present to the ED. She denies diplopia or other vision changes, or jaw pain. + Also pain in the neck and upper back for a similar duration. Pt had labs which demonstrated ESR 21 and CRP 3.5. Her exam today demonstrates mild TTP in the L temporal area. Overall, low suspicion for giant cell arteritis. Pt was out of the age range at which giant cell arteritis is usually considered the last time she was diagnosed, and even in a patient over 50 it is an unusual diagnosis in a person in their 50s, it more commonly occurs in patients in their 70s and 80s. In addition, her inflammatory markers have been negative during this admission. Would consider the possibility that pt may have had another vascular abnormality on her brain imaging 5 years ago which was interpreted as giant cell arteritis. Would also consider the possibility of Takayasu's arteritis given pt's reported history.    - Recommend MRA head/neck to further evaluate for vascular abnormalities  - Continue prednisone for now, would recommend 1 mg/kg dosing  - Pt is on methotrexate 17.5 mg q week for RA at home  - Continue leucovorin rescue 5 mg 8-12 hours after methotrexate administration
Patient seen and examined and agree with above except as noted.  Patients history, notes, labs, imaging, vitals and meds reviewed personally.  Headache resolved.  ESR/CRP not suggestive of temporal arteritis.  Degenerative changes in cervical spine with mild instability seen  Clinical exam normal (power 5/5, DTR 2+, sensory symmetric)  MRA reviewed and only anatomical variant appreciated    No further neurological workup  Would suggest following with neurosurgery as out patient to monitor for progression on instability in the future

## 2024-02-15 NOTE — H&P ADULT - HISTORY OF PRESENT ILLNESS
Ms. Dorantes is a 52 yo F w/ a PMH of Rheumatoid Arthritis, GCA (5 years ago), migraines, Bustamante Esophagus, and MYNOR presenting for progressive L sided headache x9 days. She states she was at work 9 days ago when she started to experience a severe headache that waxed and waned throughout the day. She states over the next 9 days she would repeatedly experience a L sided headache around her temple area that worsened w/ exposure to light. She endorses that she had 10/10 pain on arrival now 8/10 at time of examination. She denies fever, chest pain, shortness of breath. She states she experiences some nausea but no vomiting or diarrhea. She denies change in vision but is concerned that presentation is similar to when she had GCA 5 years ago. She is being admitted to medicine for further workup of headache.    Vitals    Labs  WBC: 7.13  K: 5.2  ESR: 21    Imaging  CT Head Non-con: Negative for acute pathology    In the ED  Prednisone 60mg x1  1L LR Bolus  Tylenol 975mg x1  Comapzine 10mg x1   Ms. Dorantes is a 54 yo F w/ a PMH of Rheumatoid Arthritis, GCA (states was diagnosed 5 years ago, no biopsy taken at that time), migraines, Bustamante Esophagus, and MYNOR presenting for progressive L sided headache x9 days. She states she was at work 9 days ago when she started to experience a severe headache that waxed and waned throughout the day. She states over the next 9 days she would repeatedly experience a L sided headache around her temple area that worsened w/ exposure to light. She endorses that she had 10/10 pain on arrival now 8/10 at time of examination. She denies fever, chest pain, shortness of breath. She states she experiences some nausea but no vomiting or diarrhea. She denies change in vision but is concerned that presentation is similar to when she had GCA 5 years ago. She is being admitted to medicine for further workup of headache.    Vitals    Labs  WBC: 7.13  K: 5.2  ESR: 21    Imaging  CT Head Non-con: Negative for acute pathology    In the ED  Prednisone 60mg x1  1L LR Bolus  Tylenol 975mg x1  Comapzine 10mg x1   Ms. Dorantes is a 52 yo F w/ a PMH of Rheumatoid Arthritis, GCA (states was diagnosed 5 years ago, no biopsy taken at that time), migraines, Bustamante Esophagus, and MYNOR presenting for progressive L sided headache x9 days. She states she was at work 9 days ago when she started to experience a severe headache that waxed and waned throughout the day. She states over the next 9 days she would repeatedly experience a L sided headache around her temple area that worsened w/ exposure to light. She endorses that she had 10/10 pain on arrival now 8/10 at time of examination. She denies fever, chest pain, shortness of breath. She states she experiences some nausea but no vomiting or diarrhea. She denies change in vision but is concerned that presentation is similar to when she had GCA 5 years ago. She is being admitted to medicine for further workup of headache.    Vitals  HR: 82  BP: 165/84  O2: 97% on RA    Labs  WBC: 7.13  K: 5.2  ESR: 21    Imaging  CT Head Non-con: Negative for acute pathology    In the ED  Prednisone 60mg x1  1L LR Bolus  Tylenol 975mg x1  Comapzine 10mg x1   Ms. Dorantes is a 52 yo F w/ a PMH of Rheumatoid Arthritis, GCA (states was diagnosed 5 years ago, no biopsy taken at that time), migraines, Bustamante Esophagus, and MYNOR presenting for progressive L sided headache x9 days. She states she was at work 9 days ago when she started to experience a severe headache that waxed and waned throughout the day. She states over the next 9 days she would repeatedly experience a L sided headache around her temple area that worsened w/ exposure to light. She endorses that she had 10/10 pain on arrival now 8/10 at time of examination. She denies fever, chest pain, shortness of breath. She states she experiences some nausea but no vomiting or diarrhea. She denies change in vision but is concerned that presentation is similar to when she had GCA 5 years ago. She is being admitted to medicine for further workup of headache.    Vitals  Temp: 98.3  HR: 82  BP: 165/84  O2: 97% on RA    Labs  WBC: 7.13  K: 5.2  ESR: 21    Imaging  CT Head Non-con: Negative for acute pathology    In the ED  Prednisone 60mg x1  1L LR Bolus  Tylenol 975mg x1  Comapzine 10mg x1

## 2024-02-15 NOTE — CONSULT NOTE ADULT - SUBJECTIVE AND OBJECTIVE BOX
Neurology Consult    Patient is a 53y old  Female who presents with a chief complaint of Headache (15 Feb 2024 16:01)      HPI:  54 yo F w/ a PMH of Rheumatoid Arthritis, GCA (states was diagnosed 5 years ago, no biopsy taken at that time), migraines, Bustamante Esophagus, and MYNOR admitted for current headaches that has persisted for nine days. She states she was at work 9 days ago she noted headaches that originally involved the top of her head bilaterally recently began to persist only on the left side around her left temple and eye. Patient also reports mild bilateral photophobia. Patient reported that motrin and tylenol were not effective in addressing the headaches but reported that fioricet was effective. Patient reports that her current headache feels very similar to her last episode of Giant Cell Arteritis, which involved the patient being on 60 mg Prednisone daily for two years and patient having follow up with her outpatient Rheumatologist every 6 months with regular monitoring of her Crp/ESR. She endorses that she had 10/10 headache on arrival with 8/10 at time of examination. Patient did report a brief increase in pain when she has gone from laying to sitting during exam. Patient reported sensation of bilateral eye pressure and mild blurriness but no acute changes in vision. Patient did report mild chest tightness that would become painful when her chest is pressed. Patient did endorse an odd odor about half an hour prior to exam, which she believes her  did not notice. Patient also had noted left knee and left shoulder pain with any pressure, with her reporting it was secondary to her RA. Patient denied any numbness/tingling, nausea/vomiting, SOB, GI distress, or other medical complaints.    Vitals    Labs  WBC: 7.13  K: 5.2  ESR: 21    Imaging  CT Head Non-con: Negative for acute pathology    In the ED  Prednisone 60mg x1  1L LR Bolus  Tylenol 975mg x1  Comapzine 10mg x1   (15 Feb 2024 15:03)      Allergies:o Known Allergies        MEDICATIONS  (STANDING):  enoxaparin Injectable 40 milliGRAM(s) SubCutaneous every 24 hours  famotidine    Tablet 40 milliGRAM(s) Oral daily  leucovorin 5 milliGRAM(s) Oral <User Schedule>  methotrexate 15 milliGRAM(s) Oral every week  pantoprazole    Tablet 40 milliGRAM(s) Oral before breakfast    MEDICATIONS  (PRN):  ibuprofen  Tablet. 600 milliGRAM(s) Oral every 6 hours PRN Temp greater or equal to 38C (100.4F), Mild Pain (1 - 3), Moderate Pain (4 - 6)      Review of systems:    Constitutional: as per HPI  Eyes: No eye pain or discharge  ENMT:  No difficulty hearing  Neck: Left shoulder pain  Respiratory: No cough, wheezing, chills or hemoptysis  Cardiovascular: Reported chest tightness. Denied palpitations, shortness of breath  Gastrointestinal: No abdominal pain, nausea, vomiting; No diarrhea or constipation.   Neurological: As per HPI  Skin: No rashes or lesions   Endocrine: No heat or cold intolerance; No hair loss  Musculoskeletal: Notable joint tenderness, particularly on the left side    Vital Signs Last 24 Hrs  T(C): 36.8 (15 Feb 2024 07:35), Max: 36.8 (15 Feb 2024 07:35)  T(F): 98.3 (15 Feb 2024 07:35), Max: 98.3 (15 Feb 2024 07:35)  HR: 82 (15 Feb 2024 07:35) (82 - 82)  BP: 165/84 (15 Feb 2024 07:35) (165/84 - 165/84)  BP(mean): --  RR: 17 (15 Feb 2024 07:35) (17 - 17)  SpO2: 97% (15 Feb 2024 07:35) (97% - 97%)    Parameters below as of 15 Feb 2024 07:35  Patient On (Oxygen Delivery Method): room air      Examination:  General:  Appearance is consistent with chronologic age.  No abnormal facies.  Gross skin survey within normal limits.    Cognitive/Language:  The patient is oriented to person, place, time. Recent and remote memory intact.  Fund of knowledge is intact and normal.  Nondysarthric.    Eyes: intact VA, VFF.  EOMI w/o reported double vision.  PERRL.  No ptosis/weakness of eyelid closure.    Face:  Facial sensation normal V1 - 3, no facial asymmetry.    Ears/Nose/Throat:  Hearing grossly intact b/l.  Palate elevates midline.  Tongue and uvula midline.   Motor examination:   Normal tone, bulk and range of motion.  No tenderness, twitching, tremors or involuntary movements. No observable drift.  Reflexes:   2+ b/l biceps, brachioradialis, Patella and Achilles limited by joint pain  Sensory examination:   Intact to light touch and temperature in all extremities.  Cerebellum:   FTN/HKS intact with normal MICHEAL in all limbs.  No dysmetria or dysdiadokinesia.      Respiratory:  no audible wheezing or inspiratory stridor.  no use of accessory muscles.   Cardiac: pulse palpable, no audible bruits    Labs:   CBC Full  -  ( 15 Feb 2024 09:28 )  WBC Count : 7.13 K/uL  RBC Count : 4.64 M/uL  Hemoglobin : 14.8 g/dL  Hematocrit : 44.3 %  Platelet Count - Automated : 198 K/uL  Mean Cell Volume : 95.5 fL  Mean Cell Hemoglobin : 31.9 pg  Mean Cell Hemoglobin Concentration : 33.4 g/dL  Auto Neutrophil # : 4.85 K/uL  Auto Lymphocyte # : 1.51 K/uL  Auto Monocyte # : 0.70 K/uL  Auto Eosinophil # : 0.03 K/uL  Auto Basophil # : 0.02 K/uL  Auto Neutrophil % : 68.0 %  Auto Lymphocyte % : 21.2 %  Auto Monocyte % : 9.8 %  Auto Eosinophil % : 0.4 %  Auto Basophil % : 0.3 %    02-15    141  |  108  |  13  ----------------------------<  98  5.2<H>   |  26  |  0.6<L>    Ca    9.3      15 Feb 2024 09:28    TPro  7.3  /  Alb  4.2  /  TBili  0.2  /  DBili  x   /  AST  16  /  ALT  14  /  AlkPhos  120<H>  02-15    LIVER FUNCTIONS - ( 15 Feb 2024 09:28 )  Alb: 4.2 g/dL / Pro: 7.3 g/dL / ALK PHOS: 120 U/L / ALT: 14 U/L / AST: 16 U/L / GGT: x             Urinalysis Basic - ( 15 Feb 2024 09:28 )    Color: x / Appearance: x / SG: x / pH: x  Gluc: 98 mg/dL / Ketone: x  / Bili: x / Urobili: x   Blood: x / Protein: x / Nitrite: x   Leuk Esterase: x / RBC: x / WBC x   Sq Epi: x / Non Sq Epi: x / Bacteria: x          Neuroimaging:  NCHCT: CT Head No Cont:   ACC: [id number] EXAM:  CT BRAIN   ORDERED BY: YAZMIN MCDANIELS     PROCEDURE DATE:  02/15/2024          INTERPRETATION:  Clinical History / Reason for exam: Headache x9 days    Technique: Noncontrast head CT.  Contiguous unenhanced CT axial images of   the head from the base to the vertex with coronal and sagittal reformats.    Comparison: CT head dated 4/17/2013    Findings:    The ventricles and cortical sulci are normal in size and configuration.    There is no acute intracranial hemorrhage, extra-axial fluid collection   or midline shift.  Gray-white matter differentiation is maintained.    The visualized paranasal sinuses and mastoids are clear.    IMPRESSION:    No CT evidence of acute intracranial pathology.    --- End of Report ---            ZEHRA MARTINEZ MD; Attending Radiologist  This document has been electronically signed. Feb 15 2024  9:15AM (02-15-24 @ 09:07)      02-15-24 @ 16:16       Neurology Consult    Patient is a 53y old  Female who presents with a chief complaint of Headache (15 Feb 2024 16:01)      HPI:  54 yo F w/ a PMH of Rheumatoid Arthritis, Giant Cell Arteritis (states was diagnosed 5 years ago, no biopsy taken at that time), migraines, Bustamante Esophagus, and MYNOR admitted for current headaches that has persisted for nine days. She states she was at work 9 days ago she noted headaches that originally involved the top of her head bilaterally recently began to persist only on the left side around her left temple and eye. Patient also reports mild bilateral photophobia. Patient reported that motrin and tylenol were not effective in addressing the headaches but reported that fioricet was effective. Patient reports that her current headache feels very similar to her last episode of Giant Cell Arteritis, which involved the patient being on 60 mg Prednisone daily for two years and patient having follow up with her outpatient Rheumatologist every 6 months with regular monitoring of her Crp/ESR. Patient did endorse though that when she last had GCA, she had noted jaw tightness that is not present currently. She endorses that she had 10/10 headache on arrival with 8/10 at time of examination. Patient did report a brief increase in pain when she has gone from laying to sitting during exam. Patient reported sensation of bilateral eye pressure and mild blurriness but no acute changes in vision. Patient did report mild chest tightness that would become painful when her chest is pressed. Patient did endorse an odd odor about half an hour prior to exam, which she believes her  did not notice. Patient also had noted left knee and left shoulder pain with any pressure, with her reporting it was secondary to her RA. Patient denied any numbness/tingling, nausea/vomiting, SOB, GI distress, or other medical complaints.    Vitals    Labs  WBC: 7.13  K: 5.2  ESR: 21    Imaging  CT Head Non-con: Negative for acute pathology    In the ED  Prednisone 60mg x1  1L LR Bolus  Tylenol 975mg x1  Comapzine 10mg x1   (15 Feb 2024 15:03)      Allergies:o Known Allergies        MEDICATIONS  (STANDING):  enoxaparin Injectable 40 milliGRAM(s) SubCutaneous every 24 hours  famotidine    Tablet 40 milliGRAM(s) Oral daily  leucovorin 5 milliGRAM(s) Oral <User Schedule>  methotrexate 15 milliGRAM(s) Oral every week  pantoprazole    Tablet 40 milliGRAM(s) Oral before breakfast    MEDICATIONS  (PRN):  ibuprofen  Tablet. 600 milliGRAM(s) Oral every 6 hours PRN Temp greater or equal to 38C (100.4F), Mild Pain (1 - 3), Moderate Pain (4 - 6)      Review of systems:    Constitutional: as per HPI  Eyes: No eye pain or discharge  ENMT:  No difficulty hearing  Neck: Left shoulder pain  Respiratory: No cough, wheezing, chills or hemoptysis  Cardiovascular: Reported chest tightness. Denied palpitations, shortness of breath  Gastrointestinal: No abdominal pain, nausea, vomiting; No diarrhea or constipation.   Neurological: As per HPI  Skin: No rashes or lesions   Endocrine: No heat or cold intolerance; No hair loss  Musculoskeletal: Notable joint tenderness, particularly on the left side    Vital Signs Last 24 Hrs  T(C): 36.8 (15 Feb 2024 07:35), Max: 36.8 (15 Feb 2024 07:35)  T(F): 98.3 (15 Feb 2024 07:35), Max: 98.3 (15 Feb 2024 07:35)  HR: 82 (15 Feb 2024 07:35) (82 - 82)  BP: 165/84 (15 Feb 2024 07:35) (165/84 - 165/84)  BP(mean): --  RR: 17 (15 Feb 2024 07:35) (17 - 17)  SpO2: 97% (15 Feb 2024 07:35) (97% - 97%)    Parameters below as of 15 Feb 2024 07:35  Patient On (Oxygen Delivery Method): room air      Examination:  General:  Appearance is consistent with chronologic age.  No abnormal facies.  Gross skin survey within normal limits.    Cognitive/Language:  The patient is oriented to person, place, time. Recent and remote memory intact.  Fund of knowledge is intact and normal.  Nondysarthric.    Eyes: intact VA, VFF.  EOMI w/o reported double vision.  PERRL.  No ptosis/weakness of eyelid closure.    Face:  Facial sensation normal V1 - 3, no facial asymmetry.    Ears/Nose/Throat:  Hearing grossly intact b/l.  Palate elevates midline.  Tongue and uvula midline.   Motor examination:   Normal tone, bulk and range of motion.  No tenderness, twitching, tremors or involuntary movements. No observable drift.  Reflexes:   2+ b/l biceps, brachioradialis, Patella and Achilles limited by joint pain  Sensory examination:   Intact to light touch and temperature in all extremities.  Cerebellum:   FTN/HKS intact with normal MICHEAL in all limbs.  No dysmetria or dysdiadokinesia.      Respiratory:  no audible wheezing or inspiratory stridor.  no use of accessory muscles.   Cardiac: pulse palpable, no audible bruits    Labs:   CBC Full  -  ( 15 Feb 2024 09:28 )  WBC Count : 7.13 K/uL  RBC Count : 4.64 M/uL  Hemoglobin : 14.8 g/dL  Hematocrit : 44.3 %  Platelet Count - Automated : 198 K/uL  Mean Cell Volume : 95.5 fL  Mean Cell Hemoglobin : 31.9 pg  Mean Cell Hemoglobin Concentration : 33.4 g/dL  Auto Neutrophil # : 4.85 K/uL  Auto Lymphocyte # : 1.51 K/uL  Auto Monocyte # : 0.70 K/uL  Auto Eosinophil # : 0.03 K/uL  Auto Basophil # : 0.02 K/uL  Auto Neutrophil % : 68.0 %  Auto Lymphocyte % : 21.2 %  Auto Monocyte % : 9.8 %  Auto Eosinophil % : 0.4 %  Auto Basophil % : 0.3 %    02-15    141  |  108  |  13  ----------------------------<  98  5.2<H>   |  26  |  0.6<L>    Ca    9.3      15 Feb 2024 09:28    TPro  7.3  /  Alb  4.2  /  TBili  0.2  /  DBili  x   /  AST  16  /  ALT  14  /  AlkPhos  120<H>  02-15    LIVER FUNCTIONS - ( 15 Feb 2024 09:28 )  Alb: 4.2 g/dL / Pro: 7.3 g/dL / ALK PHOS: 120 U/L / ALT: 14 U/L / AST: 16 U/L / GGT: x             Urinalysis Basic - ( 15 Feb 2024 09:28 )    Color: x / Appearance: x / SG: x / pH: x  Gluc: 98 mg/dL / Ketone: x  / Bili: x / Urobili: x   Blood: x / Protein: x / Nitrite: x   Leuk Esterase: x / RBC: x / WBC x   Sq Epi: x / Non Sq Epi: x / Bacteria: x          Neuroimaging:  NCHCT: CT Head No Cont:   ACC: [id number] EXAM:  CT BRAIN   ORDERED BY: YAZMIN MCDANIELS     PROCEDURE DATE:  02/15/2024          INTERPRETATION:  Clinical History / Reason for exam: Headache x9 days    Technique: Noncontrast head CT.  Contiguous unenhanced CT axial images of   the head from the base to the vertex with coronal and sagittal reformats.    Comparison: CT head dated 4/17/2013    Findings:    The ventricles and cortical sulci are normal in size and configuration.    There is no acute intracranial hemorrhage, extra-axial fluid collection   or midline shift.  Gray-white matter differentiation is maintained.    The visualized paranasal sinuses and mastoids are clear.    IMPRESSION:    No CT evidence of acute intracranial pathology.    --- End of Report ---            ZEHRA MARTINEZ MD; Attending Radiologist  This document has been electronically signed. Feb 15 2024  9:15AM (02-15-24 @ 09:07)      02-15-24 @ 16:16       Neurology Consult    Patient is a 53y old  Female who presents with a chief complaint of Headache (15 Feb 2024 16:01)      HPI:  54 yo F w/ a PMH of Rheumatoid Arthritis, Giant Cell Arteritis (states was diagnosed 5 years ago, no biopsy taken at that time), migraines, Bustamante Esophagus, and MYNOR admitted for current headaches that has persisted for nine days. She states she was at work 9 days ago she noted headaches that originally involved the top of her head bilaterally recently began to persist only on the left side around her left temple and eye. Patient also reports mild bilateral photophobia. Patient reported that motrin and tylenol were not effective in addressing the headaches but reported that fioricet was effective. Patient reports that her current headache feels very similar to her last episode of Giant Cell Arteritis, which involved the patient being on 60 mg Prednisone daily for two years and patient having follow up with her outpatient Rheumatologist every 6 months with regular monitoring of her Crp/ESR. Patient did endorse though that when she last had GCA, she had noted jaw tightness that is not present currently. She endorses that she had 10/10 headache on arrival with 8/10 at time of examination. Patient did report a brief increase in pain when she has gone from laying to sitting during exam. Patient reported sensation of bilateral eye pressure and mild blurriness but no acute changes in vision. Patient did report mild chest tightness that would become painful when her chest is pressed. Patient did endorse an odd odor about half an hour prior to exam, which she believes her  did not notice. Patient also had noted left knee and left shoulder pain with any pressure, with her reporting it was secondary to her RA. Patient denied any numbness/tingling, nausea/vomiting, SOB, GI distress, or other medical complaints.    Vitals    Labs  WBC: 7.13  K: 5.2  ESR: 21    Imaging  CT Head Non-con: Negative for acute pathology    In the ED  Prednisone 60mg x1  1L LR Bolus  Tylenol 975mg x1  Comapzine 10mg x1   (15 Feb 2024 15:03)      Allergies: No Known Allergies        MEDICATIONS  (STANDING):  enoxaparin Injectable 40 milliGRAM(s) SubCutaneous every 24 hours  famotidine    Tablet 40 milliGRAM(s) Oral daily  leucovorin 5 milliGRAM(s) Oral <User Schedule>  methotrexate 15 milliGRAM(s) Oral every week  pantoprazole    Tablet 40 milliGRAM(s) Oral before breakfast    MEDICATIONS  (PRN):  ibuprofen  Tablet. 600 milliGRAM(s) Oral every 6 hours PRN Temp greater or equal to 38C (100.4F), Mild Pain (1 - 3), Moderate Pain (4 - 6)      Review of systems:    Constitutional: as per HPI  Eyes: No eye pain or discharge  ENMT:  No difficulty hearing  Neck: Left shoulder pain  Respiratory: No cough, wheezing, chills or hemoptysis  Cardiovascular: Reported chest tightness. Denied palpitations, shortness of breath  Gastrointestinal: No abdominal pain, nausea, vomiting; No diarrhea or constipation.   Neurological: As per HPI  Skin: No rashes or lesions   Endocrine: No heat or cold intolerance; No hair loss  Musculoskeletal: Notable joint tenderness, particularly on the left side    Vital Signs Last 24 Hrs  T(C): 36.8 (15 Feb 2024 07:35), Max: 36.8 (15 Feb 2024 07:35)  T(F): 98.3 (15 Feb 2024 07:35), Max: 98.3 (15 Feb 2024 07:35)  HR: 82 (15 Feb 2024 07:35) (82 - 82)  BP: 165/84 (15 Feb 2024 07:35) (165/84 - 165/84)  BP(mean): --  RR: 17 (15 Feb 2024 07:35) (17 - 17)  SpO2: 97% (15 Feb 2024 07:35) (97% - 97%)    Parameters below as of 15 Feb 2024 07:35  Patient On (Oxygen Delivery Method): room air      Examination:  General:  Appearance is consistent with chronologic age.  No abnormal facies.  Gross skin survey within normal limits.    Cognitive/Language:  The patient is oriented to person, place, time. Recent and remote memory intact.  Fund of knowledge is intact and normal.  Nondysarthric.    Eyes: intact VA, VFF.  EOMI w/o reported double vision.  PERRL.  No ptosis/weakness of eyelid closure.    Face:  Facial sensation normal V1 - 3, no facial asymmetry.    Ears/Nose/Throat:  Hearing grossly intact b/l.  Palate elevates midline.  Tongue and uvula midline.   Motor examination:   Normal tone, bulk and range of motion.  No tenderness, twitching, tremors or involuntary movements. No observable drift.  Reflexes:   2+ b/l biceps, brachioradialis, Patella and Achilles limited by joint pain  Sensory examination:   Intact to light touch and temperature in all extremities.  Cerebellum:   FTN/HKS intact with normal MICHEAL in all limbs.  No dysmetria or dysdiadokinesia.      Respiratory:  no audible wheezing or inspiratory stridor.  no use of accessory muscles.   Cardiac: pulse palpable, no audible bruits    Labs:   CBC Full  -  ( 15 Feb 2024 09:28 )  WBC Count : 7.13 K/uL  RBC Count : 4.64 M/uL  Hemoglobin : 14.8 g/dL  Hematocrit : 44.3 %  Platelet Count - Automated : 198 K/uL  Mean Cell Volume : 95.5 fL  Mean Cell Hemoglobin : 31.9 pg  Mean Cell Hemoglobin Concentration : 33.4 g/dL  Auto Neutrophil # : 4.85 K/uL  Auto Lymphocyte # : 1.51 K/uL  Auto Monocyte # : 0.70 K/uL  Auto Eosinophil # : 0.03 K/uL  Auto Basophil # : 0.02 K/uL  Auto Neutrophil % : 68.0 %  Auto Lymphocyte % : 21.2 %  Auto Monocyte % : 9.8 %  Auto Eosinophil % : 0.4 %  Auto Basophil % : 0.3 %    02-15    141  |  108  |  13  ----------------------------<  98  5.2<H>   |  26  |  0.6<L>    Ca    9.3      15 Feb 2024 09:28    TPro  7.3  /  Alb  4.2  /  TBili  0.2  /  DBili  x   /  AST  16  /  ALT  14  /  AlkPhos  120<H>  02-15    LIVER FUNCTIONS - ( 15 Feb 2024 09:28 )  Alb: 4.2 g/dL / Pro: 7.3 g/dL / ALK PHOS: 120 U/L / ALT: 14 U/L / AST: 16 U/L / GGT: x             Urinalysis Basic - ( 15 Feb 2024 09:28 )    Color: x / Appearance: x / SG: x / pH: x  Gluc: 98 mg/dL / Ketone: x  / Bili: x / Urobili: x   Blood: x / Protein: x / Nitrite: x   Leuk Esterase: x / RBC: x / WBC x   Sq Epi: x / Non Sq Epi: x / Bacteria: x          Neuroimaging:  NCHCT: CT Head No Cont:   ACC: [id number] EXAM:  CT BRAIN   ORDERED BY: YAZMIN MCDANIELS     PROCEDURE DATE:  02/15/2024          INTERPRETATION:  Clinical History / Reason for exam: Headache x9 days    Technique: Noncontrast head CT.  Contiguous unenhanced CT axial images of   the head from the base to the vertex with coronal and sagittal reformats.    Comparison: CT head dated 4/17/2013    Findings:    The ventricles and cortical sulci are normal in size and configuration.    There is no acute intracranial hemorrhage, extra-axial fluid collection   or midline shift.  Gray-white matter differentiation is maintained.    The visualized paranasal sinuses and mastoids are clear.    IMPRESSION:    No CT evidence of acute intracranial pathology.    --- End of Report ---            ZEHRA MARTINEZ MD; Attending Radiologist  This document has been electronically signed. Feb 15 2024  9:15AM (02-15-24 @ 09:07)      02-15-24 @ 16:16

## 2024-02-15 NOTE — CONSULT NOTE ADULT - ASSESSMENT
54 yo F w/ a PMH of Rheumatoid Arthritis, GCA (states was diagnosed 5 years ago, no biopsy taken at that time), migraines, Bustamante Esophagus, and MYNOR admitted for current headaches that has persisted for nine days, with patient subjectively reporting that her current headaches feel similar to her past episode of GCA. Currently, patient presents with headaches with the differential of migraine headaches, due to her chronic history of migraines, the presence of unilateral one sided headaches with subjective photophobia and possible migraine v recurrent episode of Giant Cell Arteritis given her history of the condition, subjective report of the headaches being similar to how they were 5 years ago, and her current blurry vision. Given her current ESR of 21 and Crp of 3.5, patient presents without at least acute signs of GCA, though patient should be followed by Rheumatology. Currently, low need for acute intervention like temporal biopsy.    Migraines   R/o recurrent giant cell arteritis  - Pain management per primary team  - Recommend follow up with Rheumatology 54 yo F w/ a PMH of Rheumatoid Arthritis, GCA (states was diagnosed 5 years ago, no biopsy taken at that time), migraines, Bustamatne Esophagus, and MYNOR admitted for current headaches that has persisted for nine days, with patient subjectively reporting that her current headaches feel similar to her past episode of GCA. Currently, patient presents with headaches with the differential of migraine headaches, due to her chronic history of migraines, the presence of unilateral one sided headaches with subjective photophobia and possible aura v recurrent episode of Giant Cell Arteritis given her history of the condition and her subjective report of the headaches being similar to her last episode. Given her current ESR of 21 and Crp of 3.5, patient does not present with acute signs of GCA, but patient should be followed by Rheumatology. Of note, patient's arthritis is severe with noted pain of shoulder and knee joints on exam. Patients with severe RA are also at increased risk of atlantoaxial instability which can cause notable pain and contribute to headaches, which should be evaluated using dynamic C-Spine X-rays.     Migraines   R/o Atlantoaxial instability  R/o recurrent giant cell arteritis  - Recommend Dynamic C-Spine X-ray studies  - Recommend follow up with Rheumatology

## 2024-02-15 NOTE — H&P ADULT - ASSESSMENT
Ms. Dorantes is a 52 yo F w/ a PMH of Rheumatoid Arthritis, GCA (states was diagnosed 5 years ago, no biopsy taken at that time), migraines, Bustamante Esophagus, and MYNOR presenting for progressive L sided headache x9 days.    #L sided temporal headache x9 days associated w/ photophobia  #Hx of Giant Cell Arteritis (5 years ago, no biopsy)  #Hx of Migraines  - Sudden onset L sided headache x9 days  - Pt states she is worried this is a new episode of GCA  - Now endorses pain to palpation of b/l temples, occiput, and base of neck/shoulders  - Endorses photophobia of L eye  - CT Head non-con: Negative for acute pathology  - ESR 21, f/u CRP  - C/w Prednisone 60mg QD for now  - C/w Ibuprofen 600mg q6hr for pain control, patient refuses IV pain medication  - F/u Rheumatology  - F/u Neurology  - F/u Ophthalmology    #Rheumatoid Arthritis  - C/w Methotrexate 15mg weekly  - C/w Leucovorin 5mg weekly    #Bustamante Esophagus  - C/w Protonix 40mg QD  - C/w Famotidine 40mg QD    #Misc  #Code Status: Full Code  #Diet: DASH  #DVT ppx: Lovenox  #GI ppx: Protonix  #Dispo: GMF Ms. Dorantes is a 52 yo F w/ a PMH of Rheumatoid Arthritis, GCA (states was diagnosed 5 years ago, no biopsy taken at that time), migraines, Bustamante Esophagus, and MYNOR presenting for progressive L sided headache x9 days.    #Progressive L Sided Temporal Headache x 9 days Associated w/ Photophobia in L Eye  #Hx of Giant Cell Arteritis (5y ago, No Biopsy)  #Hx of Migraines  - Sudden onset L sided headache x 9 days  - Pt states she is worried this is a new episode of GCA  - Now endorses pain to palpation of b/l temples, occiput, and base of neck/shoulders  - Endorses photophobia of L eye  - CT Head non-con: Negative for acute pathology  - ESR: 21, f/u CRP  - C/w Prednisone 60mg QD for now  - C/w Ibuprofen 600mg q6hr for pain control, patient refuses IV pain medication  - F/u Rheumatology  - F/u Neurology  - F/u Ophthalmology    #Rheumatoid Arthritis  - C/w Methotrexate 15mg weekly  - C/w Leucovorin 5mg weekly    #Bustamante Esophagus  - C/w Protonix 40mg QD  - C/w Famotidine 40mg QD    #Misc  #Code Status: Full Code  #Diet: DASH  #DVT ppx: Lovenox  #GI ppx: Protonix  #Dispo: Floor Ms. Dorantes is a 52 yo F w/ a PMH of Rheumatoid Arthritis, GCA (states was diagnosed 5 years ago, no biopsy taken at that time), migraines, Bustamante Esophagus, and MYNOR presenting for progressive L sided headache x9 days.    #Progressive L Sided Temporal Headache x 9 days Associated w/ Photophobia in L Eye  #Hx of Giant Cell Arteritis (5y ago, No Biopsy)  #Hx of Migraines  - Sudden onset L sided headache x 9 days progressively worsening in severity and duration  - Pt states she is worried this is a new episode of GCA  - Now endorses pain to palpation of b/l temples, occiput, and base of neck/shoulders  - Endorses photophobia of L eye  - CT Head w/o Contrast: Negative for acute pathology  - ESR: 21, f/u CRP  - C/w Prednisone 60mg QD for now  - C/w Ibuprofen 600mg q6hr for pain control, patient refuses IV pain medication  - F/u Rheumatology  - F/u Neurology  - F/u Ophthalmology    #Rheumatoid Arthritis  - C/w Methotrexate 17.5mg weekly  - C/w Leucovorin 5mg weekly    #Bustamante Esophagus  - C/w Protonix 40mg QD  - C/w Famotidine 40mg QD    #Misc  #Code Status: Full Code  #Diet: DASH  #DVT ppx: Lovenox  #GI ppx: Protonix  #Dispo: Floor Ms. Dorantes is a 52 yo F w/ a PMH of Rheumatoid Arthritis, GCA (states was diagnosed 5 years ago, no biopsy taken at that time), migraines, Bustamante Esophagus, and MYNOR presenting for progressive L sided headache x9 days.    #Progressive L Sided Temporal Headache x 9 days Associated w/ Photophobia in L Eye  #Hx of Giant Cell Arteritis (5y ago, No Biopsy)  #Hx of Migraines  - Sudden onset L sided headache x 9 days progressively worsening in severity and duration  - Pt states she is worried this is a new episode of GCA  - Now endorses pain to palpation of b/l temples, occiput, and base of neck/shoulders  - Endorses photophobia of L eye  - CT Head w/o Contrast: Negative for acute pathology  - ESR: 21, f/u CRP  - C/w Prednisone 60mg QD for now  - C/w Ibuprofen 600mg q6hr for pain control, patient refuses IV pain medication  - C/w Neuro checks q6hr  - F/u Rheumatology  - F/u Neurology  - F/u Ophthalmology    #Rheumatoid Arthritis  - C/w Methotrexate 17.5mg weekly  - C/w Leucovorin 5mg weekly    #Bustamante Esophagus  - C/w Protonix 40mg QD  - C/w Famotidine 40mg QD    #Misc  #Code Status: Full Code  #Diet: DASH  #DVT ppx: Lovenox  #GI ppx: Protonix  #Dispo: Floor

## 2024-02-15 NOTE — ED PROVIDER NOTE - CLINICAL SUMMARY MEDICAL DECISION MAKING FREE TEXT BOX
Patient is a 53-year-old female with past medical history of rheumatoid arthritis, hyperlipidemia, GCA 5 years ago presenting with 9 days of headache.  Patient says headache was gradual in onset, initially worse in the frontal aspect of her scalp, now temporal, left over right.  Denies vision change but says her eyesight "feels slightly off." exam showed mild left temporal tenderness, labs slight elevation in esr, given oral prednisone ( no loss of vision) and admitted to medicine

## 2024-02-15 NOTE — H&P ADULT - NSHPREVIEWOFSYSTEMS_GEN_ALL_CORE
Constitutional: No weakness, no fevers  Eyes/ENT: No change in vision, though describes as feeling "off". Mild dizziness  Neck: Pain behind head and shoulders  Respiratory: No cough, wheezing, or hemoptysis. No shortness of breath  Cardiovascular: No chest pain or palpitations  Gastrointestinal: No abdominal or epigastric pain. No nausea, vomiting, or hematemesis. No diarrhea or constipation. No melena or hematochezia.  Genitourinary: No dysuria, frequency, or hematuria  Musculoskeletal: FROM all extremities, normal strength, no calf tenderness  Neurological: No numbness or weakness

## 2024-02-15 NOTE — ED PROVIDER NOTE - PROGRESS NOTE DETAILS
Prednisone given, will give 60 mg and withhold high-dose steroids at this time given lack of threatened, affected vision at this time -CD Spoke to patient regarding ESR of 21, admission versus close outpatient follow-up with course of prednisone, patient requesting admission at this time, states that symptoms feel identical to prior episode of giant cell arteritis, states that she does not feel comfortable with discharge and close outpatient follow-up -CD

## 2024-02-15 NOTE — ED PROVIDER NOTE - PHYSICAL EXAMINATION
CONSTITUTIONAL: Well-developed; well-nourished; NAD  SKIN: warm, dry, w/o rash  HEAD: NCAT; +TTP over LT temple without palpable or beady temporal artery  EYES: PERRLA, EOMI, no conjunctival injection; vision 20/10 b/l, in each eye and together  ENT: No nasal discharge; nl OP without erythema or exudates  NECK: Supple, non-tender  CARD: nl S1, S2; RRR, no MRG, no JVD  RESP: CTAB, normal respiratory effort  ABD: BS+, soft, NTND, no HSM  EXT: Normal ROM.  No clubbing, cyanosis or edema  NEURO: Alert, oriented, grossly unremarkable; Motor and sensation grossly intact throughout, cranial nerves II through XII grossly intact, normal cerebellar exam, no pronator drift, normal gait  PSYCH: Cooperative, appropriate

## 2024-02-15 NOTE — H&P ADULT - ATTENDING COMMENTS
My note supersedes all residents notes that I sign, My correction for their notes are in my notes   the patient  at bedside - patient stated that this is similar to her GCA 5 years ago but she stated that back then her ESR was " super high"   she stated also that her ESR was 40s few weeks ago     On exam  General: awake, alert, NAD, obese   Lungs:  clear to ausculation b/l, normal resp effort  Heart: regular rhythm   Abdomen: soft, non tender non distended  Ext: no edema, can move all  his extremities   Neurology: grossly non focal, EOMI, Eyes dilated reactive , mild discomfort with light    tenderness b.l temporal area more on the left ,some area of tender over the occipital     54 yo F w/ a PMH of Rheumatoid Arthritis, GCA (states was diagnosed 5 years ago, no biopsy taken at that time), migraines, Bustamante Esophagus, and MYNOR presenting for progressive L sided headache x9 days.    []Headache- in the setting of hx of GCA ( 5 years ago - No Biopsy) / hx of migraine   [] hX of RA   today ESR is 21 , CRP 3.5   CT head no acute findings   -s/p Prednisone 60mg by the ED - C/w with same dose daiily for now   Pt refusing to get IV access  -give Advil and Tylenol standing today   consult rheumatology and neurology and opthalmology   Neurochecks   resume home  Methotrexate and  Leucovorin for RA   send SIMRAN, ANCA/PANCA . RF  for now       DVT ppx  lovenox   GI ppx  c/w Protonix and  Famotidine 40mg QD for hx of Bustamante Esophagus My note supersedes all residents notes that I sign, My correction for their notes are in my notes   the patient  at bedside - patient stated that this is similar to her GCA 5 years ago but she stated that back then her ESR was " super high"   she stated also that her ESR was 40s few weeks ago     On exam  General: awake, alert, NAD, obese   Lungs:  clear to ausculation b/l, normal resp effort  Heart: regular rhythm   Abdomen: soft, non tender non distended  Ext: no edema, can move all  his extremities   Neurology: grossly non focal, EOMI, Eyes dilated reactive , mild discomfort with light    tenderness b.l temporal area more on the left ,some area of tender over the occipital     54 yo F w/ a PMH of Rheumatoid Arthritis, GCA (states was diagnosed 5 years ago, no biopsy taken at that time), migraines, Bustamante Esophagus, and MYNOR presenting for progressive L sided headache x9 days.    []Headache- in the setting of hx of GCA ( 5 years ago - No Biopsy) / hx of migraine   [] hX of RA   today ESR is 21 , CRP 3.5   CT head no acute findings   -s/p Prednisone 60mg by the ED - C/w with same dose daiily for now   Pt refusing to get IV access  -give Advil and Tylenol standing today   consult rheumatology and neurology and opthalmology   Neurochecks   resume home  Methotrexate and  Leucovorin for RA   send SIRMAN, ANCA/PANCA . RF  for now   monitor K and ALK PHOS       DVT ppx  lovenox   GI ppx  c/w Protonix and  Famotidine 40mg QD for hx of Bustamante Esophagus

## 2024-02-16 ENCOUNTER — TRANSCRIPTION ENCOUNTER (OUTPATIENT)
Age: 54
End: 2024-02-16

## 2024-02-16 VITALS
RESPIRATION RATE: 18 BRPM | OXYGEN SATURATION: 98 % | HEART RATE: 74 BPM | SYSTOLIC BLOOD PRESSURE: 112 MMHG | TEMPERATURE: 96 F | DIASTOLIC BLOOD PRESSURE: 66 MMHG

## 2024-02-16 PROCEDURE — 99239 HOSP IP/OBS DSCHRG MGMT >30: CPT

## 2024-02-16 PROCEDURE — 99222 1ST HOSP IP/OBS MODERATE 55: CPT

## 2024-02-16 RX ORDER — INFLUENZA VIRUS VACCINE 15; 15; 15; 15 UG/.5ML; UG/.5ML; UG/.5ML; UG/.5ML
0.5 SUSPENSION INTRAMUSCULAR ONCE
Refills: 0 | Status: DISCONTINUED | OUTPATIENT
Start: 2024-02-16 | End: 2024-02-16

## 2024-02-16 RX ORDER — METHOTREXATE 2.5 MG/1
7 TABLET ORAL
Refills: 0 | DISCHARGE

## 2024-02-16 RX ADMIN — PANTOPRAZOLE SODIUM 40 MILLIGRAM(S): 20 TABLET, DELAYED RELEASE ORAL at 05:40

## 2024-02-16 RX ADMIN — Medication 60 MILLIGRAM(S): at 05:40

## 2024-02-16 RX ADMIN — ENOXAPARIN SODIUM 40 MILLIGRAM(S): 100 INJECTION SUBCUTANEOUS at 05:44

## 2024-02-16 RX ADMIN — Medication 5 MILLIGRAM(S): at 05:40

## 2024-02-16 NOTE — PROGRESS NOTE ADULT - ASSESSMENT
Ms. Dorantes is a 54 yo F w/ a PMH of Rheumatoid Arthritis, GCA (states was diagnosed 5 years ago, no biopsy taken at that time), migraines, Bustamante Esophagus, and MYNOR presenting for progressive L sided headache x9 days now admitted to medicine for further workup of headache.    #Progressive L Sided Temporal Headache x 9 days Associated w/ Photophobia in L Eye  #Hx of Giant Cell Arteritis (5y ago, No Biopsy)  #Hx of Migraines  - Sudden onset L sided headache x 9 days progressively worsening in severity and duration  - Pt states she is worried this is a new episode of GCA  - Now endorses pain to palpation of b/l temples, occiput, and base of neck/shoulders  - Endorses photophobia of L eye  - CT Head w/o Contrast: Negative for acute pathology  - ESR: 21, f/u CRP  - C/w Prednisone 60mg QD for now  - C/w Ibuprofen 600mg q6hr for pain control, patient refuses IV pain medication  - C/w Neuro checks q6hr  - F/u Rheumatology  - F/u Neurology  - F/u Ophthalmology    #Rheumatoid Arthritis  - C/w Methotrexate 17.5mg weekly  - C/w Leucovorin 5mg weekly    #Bustamante Esophagus  - C/w Protonix 40mg QD  - C/w Famotidine 40mg QD    #Misc  - DVT ppx: Lovenox  - GI ppx: Protonix  - Diet: DASH  - Activity: AAT   Ms. Dorantes is a 52 yo F w/ a PMH of Rheumatoid Arthritis, GCA (states was diagnosed 5 years ago, no biopsy taken at that time), migraines, Bustamante Esophagus, and MYNOR presenting for progressive L sided headache x9 days now admitted to medicine for further workup of headache.    #Progressive L Sided Temporal Headache x 9 days Associated w/ Photophobia in L Eye  #Hx of Giant Cell Arteritis (5y ago, No Biopsy)  #Hx of Migraines  - Sudden onset L sided headache x 9 days progressively worsening in severity and duration  - Pt states she is worried this is a new episode of GCA  - Now endorses pain to palpation of b/l temples, occiput, and base of neck/shoulders  - Endorses photophobia of L eye  - CT Head w/o Contrast: Negative for acute pathology  - ESR: 21, f/u CRP  - C/w Prednisone 60mg QD for now  - C/w Ibuprofen 600mg q6hr for pain control, patient refuses IV pain medication  - C/w Neuro checks q6hr  - Rheumatology recs (2/15): Obtain MRA head/neck to further evaluate for vascular abnormalities, Continue prednisone for now, would recommend 1 mg/kg dosing,  Pt is on methotrexate 17.5 mg q week for RA at home, Continue leucovorin rescue 5 mg 8-12 hours after methotrexate administration .  - Neurology recs (2/15): Dynamic C-Spine X-ray studies, follow up with Rheumatology  - F/u Ophthalmology    #Rheumatoid Arthritis  - C/w Methotrexate 17.5mg weekly  - C/w Leucovorin 5mg weekly    #Bustamante Esophagus  - C/w Protonix 40mg QD  - C/w Famotidine 40mg QD    #Misc  - DVT ppx: Lovenox  - GI ppx: Protonix  - Diet: DASH  - Activity: AAT

## 2024-02-16 NOTE — DISCHARGE NOTE PROVIDER - NSDCMRMEDTOKEN_GEN_ALL_CORE_FT
famotidine 40 mg oral tablet: 1 tab(s) orally once a day  lansoprazole 30 mg oral tablet, disintegratin tab(s) orally once a day  leucovorin 10 mg oral tablet: 2 tab(s) orally every 7 days  methotrexate 2.5 mg oral tablet: 7 tab(s) orally once a week Once a week on   predniSONE 20 mg oral tablet: 2 tab(s) orally once a day Take 2 tabs for 3 more days  tiZANidine 2 mg oral tablet: 2 tab(s) orally every 6 hours

## 2024-02-16 NOTE — DISCHARGE NOTE NURSING/CASE MANAGEMENT/SOCIAL WORK - PATIENT PORTAL LINK FT
You can access the FollowMyHealth Patient Portal offered by [facility] by registering at the following website: [email or web address]. By joining [facility]’s FollowMyHealth portal, you will also be able to view your health information using other applications (apps) compatible with our system.

## 2024-02-16 NOTE — PROGRESS NOTE ADULT - SUBJECTIVE AND OBJECTIVE BOX
----------Daily Progress Note----------    HISTORY OF PRESENT ILLNESS:  Patient is a 54 yo F w/PMH of Rheumatoid Arthritis, GCA (states was diagnosed 5 years ago, no biopsy taken at that time), migraines, Bustamante Esophagus, and MYNOR presenting for progressive L sided headache around the temple area x 9 days that waxed and waned throughout the day and endorses that she had 10/10 pain on arrival now 8/10 at time of examination w/some nausea but no loss of vision, vomiting, or diarrhea. She is concerned that presentation is similar to when she had GCA 5 years ago. Her vitals were notable for hypertension to 165/84, labs notable WBC 7, K 5.2, and ESR 21, and CT Head noncon negative for acute pathology, now s/p prednisone 60mg, 1L LR Bolus, tylenol 975mg, and compazine 10mg now admitted to medicine for further workup of headache.     Today is hospital day 1d.     INTERVAL HOSPITAL COURSE / OVERNIGHT EVENTS:  O/N event:      24 hr event:      Patient was examined and seen at bedside. This morning she is resting comfortably in bed and reports no new issues or overnight events.     Review of Systems: Otherwise unremarkable     <<<<<PAST MEDICAL & SURGICAL HISTORY>>>>>    ALLERGIES  No Known Allergies      Home Medications:  famotidine 40 mg oral tablet: 1 tab(s) orally once a day (15 Feb 2024 14:37)  lansoprazole 30 mg oral tablet, disintegratin tab(s) orally once a day (15 Feb 2024 14:38)  leucovorin 10 mg oral tablet: 2 tab(s) orally every 7 days (15 Feb 2024 14:37)        MEDICATIONS  STANDING MEDICATIONS  enoxaparin Injectable 40 milliGRAM(s) SubCutaneous every 24 hours  famotidine    Tablet 40 milliGRAM(s) Oral daily  influenza   Vaccine 0.5 milliLiter(s) IntraMuscular once  leucovorin 5 milliGRAM(s) Oral <User Schedule>  methotrexate 17.5 milliGRAM(s) Oral every week  pantoprazole    Tablet 40 milliGRAM(s) Oral before breakfast  predniSONE   Tablet 60 milliGRAM(s) Oral daily    PRN MEDICATIONS  ibuprofen  Tablet. 600 milliGRAM(s) Oral every 6 hours PRN    VITALS:  T(F): 96  HR: 74  BP: 112/66  RR: 18  SpO2: 98%    <<<<<PHYSICAL EXAM>>>>>  GENERAL: Well developed, well nourished and in no acute distress. Resting comfortably in bed.  HEENT: Normocephalic, atraumatic, mucous membranes moist, EOMI, PERRLA, bilateral sclera anicteric, no conjunctival injection  Neck: Supple, non-tender, no lymphadenopathy.  PULMONARY: Clear to auscultation bilaterally. No rales, rhonchi, or wheezing.  CARDIOVASCULAR: Regular rate and rhythm, S1-S2, no murmurs  GASTROINTESTINAL: Soft, non-tender, non-distended, no guarding.  RENAL: No CVA tenderness.  SKIN/EXTREMITIES: No clubbing or edema  NEUROLOGIC/MUSCULOSKELETAL: AOx4, grossly moving all extremities, no focal deficits.    <<<<<LABS>>>>>                        14.8   7.13  )-----------( 198      ( 15 Feb 2024 09:28 )             44.3     02-15    141  |  108  |  13  ----------------------------<  98  5.2<H>   |  26  |  0.6<L>    Ca    9.3      15 Feb 2024 09:28    TPro  7.3  /  Alb  4.2  /  TBili  0.2  /  DBili  x   /  AST  16  /  ALT  14  /  AlkPhos  120<H>  02-15      Urinalysis Basic - ( 15 Feb 2024 09:28 )    Color: x / Appearance: x / SG: x / pH: x  Gluc: 98 mg/dL / Ketone: x  / Bili: x / Urobili: x   Blood: x / Protein: x / Nitrite: x   Leuk Esterase: x / RBC: x / WBC x   Sq Epi: x / Non Sq Epi: x / Bacteria: x        Sedimentation Rate, Erythrocyte: 21 mm/Hr *H* (02-15-24 @ 09:28)    [id number]        <<<<<RADIOLOGY>>>>>          -----------------------------------------------------------------------------------------------------------------------------------------------------------------------------------------------

## 2024-02-16 NOTE — DISCHARGE NOTE PROVIDER - HOSPITAL COURSE
Patient is a 52 yo F w/PMH of Rheumatoid Arthritis, GCA (states was diagnosed 5 years ago, no biopsy taken at that time), migraines, Bustamante Esophagus, and MYNOR presenting for progressive L sided headache around the temple area x 9 days that waxed and waned throughout the day and endorses that she had 10/10 pain on arrival now 8/10 at time of examination w/some nausea but no loss of vision, vomiting, or diarrhea. She is concerned that presentation is similar to when she had GCA 5 years ago. Her vitals were notable for hypertension to 165/84, labs notable WBC 7, K 5.2, and ESR 21, and CT Head noncon negative for acute pathology, now s/p prednisone 60mg, 1L LR Bolus, tylenol 975mg, and compazine 10mg now admitted to medicine for further workup of headache. She reports that her headache has improved today and that she wishes to be discharged.    #Progressive L Sided Temporal Headache x 9 days Associated w/ Photophobia in L Eye (resolved)  #Hx of Giant Cell Arteritis (5y ago, No Biopsy)  #Hx of Migraines  - s/p prednisone 60 mg daily  - discharge on prednisone 40 mg daily for 3 more days  - f/u w/her rheumatologist in 1-2 weeks    #Rheumatoid Arthritis  - C/w Methotrexate 17.5mg weekly  - C/w Leucovorin 5mg weekly    #Bustamante Esophagus  - C/w Protonix 40mg QD  - C/w Famotidine 40mg QD

## 2024-02-16 NOTE — DISCHARGE NOTE PROVIDER - NSDCCPCAREPLAN_GEN_ALL_CORE_FT
PRINCIPAL DISCHARGE DIAGNOSIS  Diagnosis: Temporal pain  Assessment and Plan of Treatment: You came to the hospital due bilateral temporal headache for 9 days and photophobia and pressure in your left eye. You were treated with prednisone, which resolved your symptoms. You requested to be discharged today. You should continue prednisone two tablets once a day for 3 more days. Please follow up with your rheumatologist within 1-2 weeks to reassess for pain and for further work up for the cause of the headaches. If you notice unbearable headache or have visual changes, please come back to the emergency room.     PRINCIPAL DISCHARGE DIAGNOSIS  Diagnosis: Temporal pain  Assessment and Plan of Treatment: You came to the hospital due bilateral temporal headache for 9 days and photophobia and pressure in your left eye. You were treated with prednisone, which resolved your symptoms. You requested to be discharged today. You should continue prednisone two tablets once a day for 3 more days. Please follow up with your rheumatologist within 1-2 weeks to reassess for pain and for further work up for the cause of the headaches. Also, please follow up with your PCP in 2 weeks. If you notice unbearable headache or have visual changes, please come back to the emergency room.

## 2024-02-16 NOTE — PATIENT PROFILE ADULT - NSPROMEDSBROUGHTTOHOSP_GEN_A_NUR
Telemedicine Visit: The patient's condition can be safely assessed and treated via synchronous audio and visual telemedicine encounter.      Reason for Telemedicine Visit: Services only offered telehealth    Originating Site (Patient Location): Patient's home    Distant Site (Provider Location): Provider Remote Setting- Home Office    Consent:  The patient/guardian has verbally consented to: the potential risks and benefits of telemedicine (video visit) versus in person care; bill my insurance or make self-payment for services provided; and responsibility for payment of non-covered services.     Mode of Communication:  Video Conference via Zoom    Call Started at: 9:30 AM  Call Ended at: 12:10 PM    As the provider I attest to compliance with applicable laws and regulations related to telemedicine.   no

## 2024-02-16 NOTE — DISCHARGE NOTE PROVIDER - CARE PROVIDER_API CALL
Kimberly Muhammad J  Internal Medicine  [address] [place] [address]  Phone: [phone]  Fax: [phone]  Established Patient  Follow Up Time: 2 weeks

## 2024-02-17 NOTE — CHART NOTE - NSCHARTNOTEFT_GEN_A_CORE
As of 02/16/24:    We received a consult request for an ophtho evaluation, on 02/15/24, contacted the primary team to coordinate transport for the pt and were informed that the pt is refusing ophthalmology assessment. The pt stated they have an ophthalmologist and was seen recently and is refusing to be seen by the [facility] ophthalmology team and is refusing ophthalmology assessment.  Please reconsult if pt becomes amenable or advise pt to f/u with own ophthalmologist upon discharge.

## 2024-02-21 DIAGNOSIS — M06.9 RHEUMATOID ARTHRITIS, UNSPECIFIED: ICD-10-CM

## 2024-02-21 DIAGNOSIS — R51.9 HEADACHE, UNSPECIFIED: ICD-10-CM

## 2024-02-21 DIAGNOSIS — K22.70 BARRETT'S ESOPHAGUS WITHOUT DYSPLASIA: ICD-10-CM

## 2024-02-21 DIAGNOSIS — G47.33 OBSTRUCTIVE SLEEP APNEA (ADULT) (PEDIATRIC): ICD-10-CM

## 2024-08-06 ENCOUNTER — OUTPATIENT (OUTPATIENT)
Dept: OUTPATIENT SERVICES | Facility: HOSPITAL | Age: 54
LOS: 1 days | End: 2024-08-06
Payer: COMMERCIAL

## 2024-08-06 DIAGNOSIS — Z00.8 ENCOUNTER FOR OTHER GENERAL EXAMINATION: ICD-10-CM

## 2024-08-06 DIAGNOSIS — E04.1 NONTOXIC SINGLE THYROID NODULE: ICD-10-CM

## 2024-08-06 PROCEDURE — 76536 US EXAM OF HEAD AND NECK: CPT | Mod: 26

## 2024-08-06 PROCEDURE — 76536 US EXAM OF HEAD AND NECK: CPT

## 2024-08-07 DIAGNOSIS — E04.1 NONTOXIC SINGLE THYROID NODULE: ICD-10-CM

## 2024-10-26 ENCOUNTER — OUTPATIENT (OUTPATIENT)
Dept: OUTPATIENT SERVICES | Facility: HOSPITAL | Age: 54
LOS: 1 days | End: 2024-10-26
Payer: COMMERCIAL

## 2024-10-26 DIAGNOSIS — Z12.31 ENCOUNTER FOR SCREENING MAMMOGRAM FOR MALIGNANT NEOPLASM OF BREAST: ICD-10-CM

## 2024-10-26 PROCEDURE — 77067 SCR MAMMO BI INCL CAD: CPT | Mod: 26

## 2024-10-26 PROCEDURE — 77067 SCR MAMMO BI INCL CAD: CPT

## 2024-10-26 PROCEDURE — 77063 BREAST TOMOSYNTHESIS BI: CPT

## 2024-10-26 PROCEDURE — 77063 BREAST TOMOSYNTHESIS BI: CPT | Mod: 26

## 2024-10-27 DIAGNOSIS — Z12.31 ENCOUNTER FOR SCREENING MAMMOGRAM FOR MALIGNANT NEOPLASM OF BREAST: ICD-10-CM

## 2025-07-29 ENCOUNTER — NON-APPOINTMENT (OUTPATIENT)
Age: 55
End: 2025-07-29

## 2025-07-30 ENCOUNTER — APPOINTMENT (OUTPATIENT)
Dept: OTOLARYNGOLOGY | Facility: CLINIC | Age: 55
End: 2025-07-30
Payer: COMMERCIAL

## 2025-07-30 VITALS — HEIGHT: 65 IN | BODY MASS INDEX: 36.65 KG/M2 | WEIGHT: 220 LBS

## 2025-07-30 DIAGNOSIS — H90.3 SENSORINEURAL HEARING LOSS, BILATERAL: ICD-10-CM

## 2025-07-30 DIAGNOSIS — H93.8X3 OTHER SPECIFIED DISORDERS OF EAR, BILATERAL: ICD-10-CM

## 2025-07-30 PROCEDURE — 92557 COMPREHENSIVE HEARING TEST: CPT

## 2025-07-30 PROCEDURE — 92567 TYMPANOMETRY: CPT

## 2025-07-30 PROCEDURE — 99204 OFFICE O/P NEW MOD 45 MIN: CPT

## 2025-07-31 PROBLEM — H93.8X3 SENSATION OF FULLNESS IN BOTH EARS: Status: ACTIVE | Noted: 2025-07-31

## 2025-08-09 ENCOUNTER — EMERGENCY (EMERGENCY)
Facility: HOSPITAL | Age: 55
LOS: 0 days | Discharge: HOME | End: 2025-08-10
Attending: STUDENT IN AN ORGANIZED HEALTH CARE EDUCATION/TRAINING PROGRAM
Payer: COMMERCIAL

## 2025-08-09 VITALS
DIASTOLIC BLOOD PRESSURE: 78 MMHG | OXYGEN SATURATION: 99 % | RESPIRATION RATE: 20 BRPM | SYSTOLIC BLOOD PRESSURE: 140 MMHG | TEMPERATURE: 98 F | HEART RATE: 80 BPM

## 2025-08-09 DIAGNOSIS — R11.0 NAUSEA: ICD-10-CM

## 2025-08-09 DIAGNOSIS — K21.9 GASTRO-ESOPHAGEAL REFLUX DISEASE WITHOUT ESOPHAGITIS: ICD-10-CM

## 2025-08-09 DIAGNOSIS — R51.9 HEADACHE, UNSPECIFIED: ICD-10-CM

## 2025-08-09 DIAGNOSIS — M32.9 SYSTEMIC LUPUS ERYTHEMATOSUS, UNSPECIFIED: ICD-10-CM

## 2025-08-09 LAB
ALBUMIN SERPL ELPH-MCNC: 4.5 G/DL — SIGNIFICANT CHANGE UP (ref 3.5–5.2)
ALP SERPL-CCNC: 109 U/L — SIGNIFICANT CHANGE UP (ref 30–115)
ALT FLD-CCNC: 15 U/L — SIGNIFICANT CHANGE UP (ref 0–41)
ANION GAP SERPL CALC-SCNC: 11 MMOL/L — SIGNIFICANT CHANGE UP (ref 7–14)
AST SERPL-CCNC: 21 U/L — SIGNIFICANT CHANGE UP (ref 0–41)
BASOPHILS # BLD AUTO: 0.01 K/UL — SIGNIFICANT CHANGE UP (ref 0–0.2)
BASOPHILS NFR BLD AUTO: 0.2 % — SIGNIFICANT CHANGE UP (ref 0–1)
BILIRUB SERPL-MCNC: 0.4 MG/DL — SIGNIFICANT CHANGE UP (ref 0.2–1.2)
BUN SERPL-MCNC: 11 MG/DL — SIGNIFICANT CHANGE UP (ref 10–20)
CALCIUM SERPL-MCNC: 9.5 MG/DL — SIGNIFICANT CHANGE UP (ref 8.4–10.5)
CHLORIDE SERPL-SCNC: 104 MMOL/L — SIGNIFICANT CHANGE UP (ref 98–110)
CO2 SERPL-SCNC: 25 MMOL/L — SIGNIFICANT CHANGE UP (ref 17–32)
CREAT SERPL-MCNC: 0.8 MG/DL — SIGNIFICANT CHANGE UP (ref 0.7–1.5)
EGFR: 86 ML/MIN/1.73M2 — SIGNIFICANT CHANGE UP
EGFR: 86 ML/MIN/1.73M2 — SIGNIFICANT CHANGE UP
EOSINOPHIL # BLD AUTO: 0.03 K/UL — SIGNIFICANT CHANGE UP (ref 0–0.7)
EOSINOPHIL NFR BLD AUTO: 0.5 % — SIGNIFICANT CHANGE UP (ref 0–8)
ERYTHROCYTE [SEDIMENTATION RATE] IN BLOOD: 24 MM/HR — HIGH (ref 0–20)
GLUCOSE SERPL-MCNC: 92 MG/DL — SIGNIFICANT CHANGE UP (ref 70–99)
HCG SERPL QL: NEGATIVE — SIGNIFICANT CHANGE UP
HCT VFR BLD CALC: 40.9 % — SIGNIFICANT CHANGE UP (ref 37–47)
HGB BLD-MCNC: 14 G/DL — SIGNIFICANT CHANGE UP (ref 12–16)
IMM GRANULOCYTES NFR BLD AUTO: 0.3 % — SIGNIFICANT CHANGE UP (ref 0.1–0.3)
LYMPHOCYTES # BLD AUTO: 1.38 K/UL — SIGNIFICANT CHANGE UP (ref 1.2–3.4)
LYMPHOCYTES # BLD AUTO: 20.8 % — SIGNIFICANT CHANGE UP (ref 20.5–51.1)
MCHC RBC-ENTMCNC: 32.5 PG — HIGH (ref 27–31)
MCHC RBC-ENTMCNC: 34.2 G/DL — SIGNIFICANT CHANGE UP (ref 32–37)
MCV RBC AUTO: 94.9 FL — SIGNIFICANT CHANGE UP (ref 81–99)
MONOCYTES # BLD AUTO: 0.77 K/UL — HIGH (ref 0.1–0.6)
MONOCYTES NFR BLD AUTO: 11.6 % — HIGH (ref 1.7–9.3)
NEUTROPHILS # BLD AUTO: 4.44 K/UL — SIGNIFICANT CHANGE UP (ref 1.4–6.5)
NEUTROPHILS NFR BLD AUTO: 66.6 % — SIGNIFICANT CHANGE UP (ref 42.2–75.2)
NRBC BLD AUTO-RTO: 0 /100 WBCS — SIGNIFICANT CHANGE UP (ref 0–0)
PLATELET # BLD AUTO: 197 K/UL — SIGNIFICANT CHANGE UP (ref 130–400)
PMV BLD: 9.2 FL — SIGNIFICANT CHANGE UP (ref 7.4–10.4)
POTASSIUM SERPL-MCNC: 4.1 MMOL/L — SIGNIFICANT CHANGE UP (ref 3.5–5)
POTASSIUM SERPL-SCNC: 4.1 MMOL/L — SIGNIFICANT CHANGE UP (ref 3.5–5)
PROT SERPL-MCNC: 6.9 G/DL — SIGNIFICANT CHANGE UP (ref 6–8)
RBC # BLD: 4.31 M/UL — SIGNIFICANT CHANGE UP (ref 4.2–5.4)
RBC # FLD: 12.6 % — SIGNIFICANT CHANGE UP (ref 11.5–14.5)
SODIUM SERPL-SCNC: 140 MMOL/L — SIGNIFICANT CHANGE UP (ref 135–146)
WBC # BLD: 6.65 K/UL — SIGNIFICANT CHANGE UP (ref 4.8–10.8)
WBC # FLD AUTO: 6.65 K/UL — SIGNIFICANT CHANGE UP (ref 4.8–10.8)

## 2025-08-09 PROCEDURE — 96375 TX/PRO/DX INJ NEW DRUG ADDON: CPT

## 2025-08-09 PROCEDURE — 96374 THER/PROPH/DIAG INJ IV PUSH: CPT

## 2025-08-09 PROCEDURE — 85652 RBC SED RATE AUTOMATED: CPT

## 2025-08-09 PROCEDURE — 80053 COMPREHEN METABOLIC PANEL: CPT

## 2025-08-09 PROCEDURE — 70450 CT HEAD/BRAIN W/O DYE: CPT | Mod: 26

## 2025-08-09 PROCEDURE — 85025 COMPLETE CBC W/AUTO DIFF WBC: CPT

## 2025-08-09 PROCEDURE — 70450 CT HEAD/BRAIN W/O DYE: CPT

## 2025-08-09 PROCEDURE — 99284 EMERGENCY DEPT VISIT MOD MDM: CPT | Mod: 25

## 2025-08-09 PROCEDURE — 99285 EMERGENCY DEPT VISIT HI MDM: CPT

## 2025-08-09 PROCEDURE — 84703 CHORIONIC GONADOTROPIN ASSAY: CPT

## 2025-08-09 PROCEDURE — 36415 COLL VENOUS BLD VENIPUNCTURE: CPT

## 2025-08-09 RX ORDER — MAGNESIUM SULFATE 500 MG/ML
2 SYRINGE (ML) INJECTION ONCE
Refills: 0 | Status: COMPLETED | OUTPATIENT
Start: 2025-08-09 | End: 2025-08-09

## 2025-08-09 RX ORDER — ACETAMINOPHEN 500 MG/5ML
975 LIQUID (ML) ORAL ONCE
Refills: 0 | Status: COMPLETED | OUTPATIENT
Start: 2025-08-09 | End: 2025-08-09

## 2025-08-09 RX ORDER — METOCLOPRAMIDE HCL 10 MG
10 TABLET ORAL ONCE
Refills: 0 | Status: COMPLETED | OUTPATIENT
Start: 2025-08-09 | End: 2025-08-09

## 2025-08-09 RX ORDER — PREDNISONE 20 MG/1
1 TABLET ORAL
Qty: 5 | Refills: 0
Start: 2025-08-09 | End: 2025-08-13

## 2025-08-09 RX ADMIN — Medication 104 MILLIGRAM(S): at 20:52

## 2025-08-09 RX ADMIN — Medication 1000 MILLILITER(S): at 20:51

## 2025-08-09 RX ADMIN — Medication 25 GRAM(S): at 20:52

## 2025-08-09 RX ADMIN — Medication 975 MILLIGRAM(S): at 19:14
